# Patient Record
Sex: MALE | Race: WHITE | NOT HISPANIC OR LATINO
[De-identification: names, ages, dates, MRNs, and addresses within clinical notes are randomized per-mention and may not be internally consistent; named-entity substitution may affect disease eponyms.]

---

## 2018-04-02 ENCOUNTER — APPOINTMENT (OUTPATIENT)
Dept: HEART AND VASCULAR | Facility: CLINIC | Age: 58
End: 2018-04-02

## 2018-04-09 PROBLEM — Z00.00 ENCOUNTER FOR PREVENTIVE HEALTH EXAMINATION: Status: ACTIVE | Noted: 2018-04-09

## 2018-04-30 ENCOUNTER — APPOINTMENT (OUTPATIENT)
Dept: HEART AND VASCULAR | Facility: CLINIC | Age: 58
End: 2018-04-30
Payer: MEDICARE

## 2018-04-30 DIAGNOSIS — Z78.9 OTHER SPECIFIED HEALTH STATUS: ICD-10-CM

## 2018-04-30 PROCEDURE — XXXXX: CPT

## 2018-05-01 PROBLEM — Z78.9 NON-SMOKER: Status: ACTIVE | Noted: 2018-05-01

## 2018-05-01 RX ORDER — LEVOTHYROXINE SODIUM 0.05 MG/1
50 TABLET ORAL
Qty: 90 | Refills: 0 | Status: DISCONTINUED | COMMUNITY
Start: 2017-09-28

## 2018-05-01 RX ORDER — SUMATRIPTAN 50 MG/1
50 TABLET, FILM COATED ORAL
Qty: 30 | Refills: 0 | Status: DISCONTINUED | COMMUNITY
Start: 2018-01-11

## 2018-05-01 RX ORDER — TIZANIDINE 4 MG/1
4 TABLET ORAL
Qty: 60 | Refills: 0 | Status: DISCONTINUED | COMMUNITY
Start: 2018-01-18

## 2018-05-01 RX ORDER — SILDENAFIL 20 MG/1
20 TABLET ORAL
Qty: 60 | Refills: 0 | Status: DISCONTINUED | COMMUNITY
Start: 2017-09-13

## 2018-05-01 RX ORDER — CYCLOBENZAPRINE HYDROCHLORIDE 5 MG/1
5 TABLET, FILM COATED ORAL
Qty: 90 | Refills: 0 | Status: DISCONTINUED | COMMUNITY
Start: 2017-10-23

## 2018-05-01 RX ORDER — AMIODARONE HYDROCHLORIDE 200 MG/1
200 TABLET ORAL
Qty: 30 | Refills: 0 | Status: DISCONTINUED | COMMUNITY
Start: 2017-12-28 | End: 2018-05-01

## 2018-05-01 RX ORDER — MORPHINE SULFATE 15 MG/1
15 TABLET ORAL
Qty: 60 | Refills: 0 | Status: DISCONTINUED | COMMUNITY
Start: 2018-03-06

## 2018-05-01 RX ORDER — AMIODARONE HYDROCHLORIDE 200 MG/1
200 TABLET ORAL
Qty: 30 | Refills: 0 | Status: DISCONTINUED | COMMUNITY
Start: 2017-12-28

## 2018-05-01 RX ORDER — METOPROLOL SUCCINATE 25 MG/1
25 TABLET, EXTENDED RELEASE ORAL
Qty: 90 | Refills: 0 | Status: DISCONTINUED | COMMUNITY
Start: 2017-11-06

## 2018-05-01 RX ORDER — PANTOPRAZOLE 40 MG/1
40 TABLET, DELAYED RELEASE ORAL
Qty: 90 | Refills: 0 | Status: DISCONTINUED | COMMUNITY
Start: 2017-11-06

## 2018-05-01 RX ORDER — ESCITALOPRAM OXALATE 20 MG/1
20 TABLET ORAL
Qty: 90 | Refills: 0 | Status: DISCONTINUED | COMMUNITY
Start: 2018-01-30

## 2018-05-01 RX ORDER — MORPHINE SULFATE 30 MG/1
30 TABLET, FILM COATED, EXTENDED RELEASE ORAL
Qty: 60 | Refills: 0 | Status: DISCONTINUED | COMMUNITY
Start: 2018-04-05

## 2018-05-01 RX ORDER — METOPROLOL TARTRATE 25 MG/1
25 TABLET, FILM COATED ORAL
Qty: 90 | Refills: 0 | Status: DISCONTINUED | COMMUNITY
Start: 2017-12-25

## 2019-02-24 ENCOUNTER — RECORD ABSTRACTING (OUTPATIENT)
Age: 59
End: 2019-02-24

## 2019-02-24 DIAGNOSIS — M35.3 POLYMYALGIA RHEUMATICA: ICD-10-CM

## 2019-02-24 DIAGNOSIS — Z12.5 ENCOUNTER FOR SCREENING FOR MALIGNANT NEOPLASM OF PROSTATE: ICD-10-CM

## 2019-02-24 DIAGNOSIS — Z86.39 PERSONAL HISTORY OF OTHER ENDOCRINE, NUTRITIONAL AND METABOLIC DISEASE: ICD-10-CM

## 2019-02-24 DIAGNOSIS — Z86.69 PERSONAL HISTORY OF OTHER DISEASES OF THE NERVOUS SYSTEM AND SENSE ORGANS: ICD-10-CM

## 2019-02-24 DIAGNOSIS — Z83.49 FAMILY HISTORY OF OTHER ENDOCRINE, NUTRITIONAL AND METABOLIC DISEASES: ICD-10-CM

## 2019-02-24 DIAGNOSIS — Z82.49 FAMILY HISTORY OF ISCHEMIC HEART DISEASE AND OTHER DISEASES OF THE CIRCULATORY SYSTEM: ICD-10-CM

## 2019-02-24 DIAGNOSIS — E56.9 VITAMIN DEFICIENCY, UNSPECIFIED: ICD-10-CM

## 2019-02-24 RX ORDER — OMEPRAZOLE 20 MG/1
20 CAPSULE, DELAYED RELEASE ORAL
Refills: 0 | Status: ACTIVE | COMMUNITY

## 2019-03-15 ENCOUNTER — APPOINTMENT (OUTPATIENT)
Dept: ENDOCRINOLOGY | Facility: CLINIC | Age: 59
End: 2019-03-15
Payer: MEDICARE

## 2019-03-15 VITALS
WEIGHT: 250 LBS | DIASTOLIC BLOOD PRESSURE: 90 MMHG | HEART RATE: 101 BPM | HEIGHT: 76 IN | BODY MASS INDEX: 30.44 KG/M2 | SYSTOLIC BLOOD PRESSURE: 140 MMHG

## 2019-03-15 PROCEDURE — 99213 OFFICE O/P EST LOW 20 MIN: CPT

## 2019-05-30 NOTE — PHYSICAL EXAM
[Alert] : alert [No Acute Distress] : no acute distress [Well Nourished] : well nourished [Well Developed] : well developed [Oriented x3] : oriented to person, place, and time [Normal Insight/Judgement] : insight and judgment were intact [Normal Affect] : the affect was normal [Normal Mood] : the mood was normal

## 2019-05-30 NOTE — HISTORY OF PRESENT ILLNESS
[FreeTextEntry1] : March 15, 2019\par \par   PCP: Dr. Christa Caldera\par             Card: Dr. Yair Whalen -> Dr. Martinez ([facility])\par             and at [facility] - Dr. Tyler Fletcher fax \par             ablation at [place] - Dr. Gonzales ......\par             Pain: Dr. Johanna Bailey at [facility] - tried morphine but w/o success \par             (Dr. Marques) also\par             Dr. Vaibhav Vail at [place], [place]. \[facility]"" at [address]\par             advised stopping Lexapro and switch to another \par             medication and then see psychiatrist. \par             Now Dr. Dobson Pain Mgt in [place] - treated\par             with morphine 1/2 QID (still sees)\par            .\par            .\par            CC: Hypothyroidism/Hashimoto's thyroiditis (++abs)/goiter, TSH 5.86)\par             8/7/2015 U/S Thyroid: goiter \par             Fatigue\par             ED\par             (Hx back pain, back surgery Armaan Caio Segura at [facility] with ? sepsis & induced coma) - 2013. \par             (atrial fibrillation) on amiodarone\par             (son: DM1)\par             (anti-parietal cell antibodies)\par            .\par \par Recent TSH from\par 2/5/2019 kindly provided by Dr. Thompson withing normal range at 3.85\par HbA1c 5.4 \par vit B12 827\par \par Impression:  He has a goiter, hypothyroidism well controlled.\par \par Plan:  Same Rx.   Follow up ultrasound thyroid, probably by end of the year.\par ROV 6 months.  \par \par \par \par \par Previous notes from [employer] appended below.\par \par  October 23, 2018\par            .\par            PCP: Dr. Christa Caldera\par             Card: Dr. Yair Whalen -> Dr. Martinez ([facility])\par             and at [facility] - Dr. Tyler Fletcher fax \par             ablation at [place] - Dr. Gonzales ......\par             Pain: Dr. Johanna Bailey at [facility] - tried morphine but w/o success \par             (Dr. Marques) also\par             Dr. Vaibhav Vail at [place], [place]. \[facility]"" at [address]\par             advised stopping Lexapro and switch to another \par             medication and then see psychiatrist. \par             Now Dr. Dobson Pain Mgt in [place] - treated\par             with morphine 1/2 QID (still sees)\par            .\par            .\par            CC: Hypothyroidism/Hashimoto's thyroiditis (++abs)/goiter, TSH 5.86)\par             8/7/2015 U/S Thyroid: goiter \par             Fatigue\par             ED\par             (Hx back pain, back surgery Armaanpj Segura at [facility] with ? sepsis & induced coma) - 2013. \par             (atrial fibrillation) on amiodarone\par             (son: DM1)\par             (anti-parietal cell antibodies)\par            .\par            Needs morphine for pain.\par            Had cardiac ablation at [place] so he does not currently require amiodarone.\par            Visits regarding hypothyroidism\par            Recent TSH wihin normal limits on levothyroxine 50 mcg daily.\par            .\par            Ultrasound thyroid at [place]\par            8/7/2015:\par            .\par            "Clinical history: Hypothyroidism. Rule out nodules. \par            Comparison: None.\par            Sonography of the thyroid gland was performed.\par            The isthmus measures 4 mm.\par            The right lobe measures 6.3 x 1.9 cm.\par            The left lobe measures 6.3 x 1.9 cm.\par            The glands are diffusely and moderately heterogeneous.\par            Please evaluate for thyroiditis.\par            Vascularity is grossly unremarkable.\par            There is no evidence of mass, cyst or other abnormality.\par            Clinical correlation advised.\par            ***Electronically Signed ***\par            -----------------------------------------------\par            Everton Cooper MD 08/07/15"\par            .\par            Impression: Hypothryoidism well controlled.\par            Has simple goiter.\par            .\par            Plan: Consider follow up ultrasound in 2019.\par            Same levothyroxine 50 mcg daily. \par            Updated B12, MMA in future.\par            Updated Te, LH in future.\par            Continue sildenifil prn. - warned\par            .\par            ==\par            .\par            .\par            .\par            .\par            March 13, 2018...............................................................................ab\par            .\par            PCP: Dr. Christa Caldera\par             Card: Dr. Yair Whalen -> Dr. Martinez ([facility])\par             and at [facility] - Dr. Tyler Fletcher fax \par             Pain: Dr. Johanna Bailey at [facility] - tried morphine but w/o success \par             (Dr. Marques) also\par             Dr. Vaibhav Vail at [place], [place]. \[facility]"" at [address]\par             advised stopping Lexapro and switch to another \par             medication and then see psychiatrist. \par             Now Dr. Dobson Pain Mgt in [place] - treated\par             with morphine 1/2 QID \par            .\par            .\par            CC: Hypothyroidism/Hashimoto's thyroiditis (++abs)/goiter, TSH 5.86)\par             Fatigue\par             (Hx back pain, back surgery Armaan Segura at [facility] with ? sepsis & induced coma) - 2013. \par             (atrial fibrillation) on amiodarone\par             (son: DM1)\par             (anti-parietal cell antibodies)\par            .\par            Today is his son's 8 th birthday. His son is learning fencing from a master. \par            .\par            Mr. Newell remains on amiodarone for SVT. Has also undergone ablation with [facility] although there are still some episodes. He remains on morphine tablets for back pain and levothyroxine 50 mcg for hypothyroidism. \par            .\par            Impression: Hypothyroidism well controlled.\par            .\par            Plan: Updated TFTs\par            ROV six months\par            .\par            ==\par            .\par            .\par            Sept 13, 2017\par            .\par            PCP: Dr. Christa Caldera\par             Card: Dr. Yair Whalen -> Dr. Martinez ([facility])\par             and at [facility] - Dr. Tyler Fletcher fax \par             Pain: Dr. Johanna Bailey at [facility] - tried morphine but w/o success \par             Dr. Marques also\par             Dr. Vaibhav Vail at [facility] at [address]\par             advised stopping Lexapro and switch to another \par             medication and then see psychiatrist. \par            .\par            .\par            CC: Hypothyroidism/Hashimoto's thyroiditis (++abs)/goiter, TSH 5.86)\par             Fatigue\par             (Hx back pain, back surgery Armaanpj Segura at [facility] with ? sepsis & induced coma) - 2013. \par             (atrial fibrillation)\par             (son: DM1)\par             (anti-parietal cell antibodies)\par            .\par            58 yo - was involved in [employer]\par            . \par            Had ablation at [facility] about a month ago.\par            Arrhythmia is now less frequent and of shorter duration. \par            .\par            Cuts 15 mg morphine and uses the 7.5 about 3-4 times a day.\par            Works in about 30 minutes and last a few hours. \par            .\par            Now on Losartin for BP.\par            .\par            Remains on levothyroxine 50 mcg daily\par            .\par            Impression: TFTs appear to be in good range. \par            Hct upper limits of normal.\par            .\par            Plan: Discussed use of Cytomel as adjunct, but not in an\par            individual prone to SVT. Will check TFTs, ROV 6 months.\par            .\par            ==\par            .\par            May 10, 2017 .ab\par            .\par            PCP: Dr. Christa Caldera\par             Card: Dr. Yair Whalen -> Dr. Martinez ([facility])\par             and at [facility] - Dr. Tyler Fletcher fax \par             Pain: Dr. Johanna Bailey at [facility] - tried morphine but w/o success \par             Dr. Marques also\par             Dr. Vaibhav Vail at [facility] at [address]\par             advised stopping Lexapro and switch to another \par             medication and then see psychiatrist. \par            .\par            .\par            CC: Hypothyroidism/Hashimoto's thyroiditis (++abs)/goiter, TSH 5.86)\par             Fatigue\par             (Hx back pain, back surgery Armaan Caio Segura at [facility] with ? sepsis & induced coma) - 2013. \par             (atrial fibrillation)\par             (son: DM1)\par             (anti-parietal cell antibodies)\par            .\par            On po morphine for back pain via his physicians on [address].\par            .\par            Has Hashimoto's.\par            .\par            Impression: Has history of atrial fibrillation.\par            Notes fatigue.\par            On morphine for back pain.\par            .\par            Plan: Updated labs.\par            Call here two weeks for results\par            ==\par            .\par            Jan 11, 2017\par            .\par            PCP: Dr. Christa Caldera\par             Card: Dr. Yair Whalen -> Dr. Martinez ([facility])\par             and at [facility] - Dr. Tyler Fletcher fax \par             Pain: Dr. Johanna Bailey at [facility] - tried morphine but w/o success \par             Dr. Marques also\par             Dr. Vaibhav Vail at [place], [place]. \[facility]"" at [address]\par             advised stopping Lexapro and switch to another \par             medication and then see psychiatrist. \par            .\par            .\par            On levothyroxine 50 mcg daily for hypothyroidism d/t Hashimotos\par            Likes history and stocks.\par            Hx atrial fibrillation. \par            Back pain. \par            Uses oral morphine 15 mg BID - Dr. Dania Walter in [facility]\par            .\par            Impression: Hypothyroidism well controlled.\par            No atrial fibrillation.\par            Plan: TFTs today and f/u Dr. Thompson and cardiology and ROV in 6 months Thank you. -\par            .\par            ==\par            .\par            Sept 14, 2016\par            .\par            PCP: Dr. Christa Caldera\par             Card: Dr. Yair Whalen -> Dr. Martinez ([facility])\par             and at [facility] - Dr. Tyler Fletcher fax \par             Pain: Dr. Johanna Bailey at [facility] - tried morphine but w/o success \par             Dr. Marques also\par             Dr. Vaibhav Vail at [facility] at [address]\par             advised stopping Lexapro and switch to another \par             medication and then see psychiatrist. \par            .\par            CC: Hypothyroidism/Hashimoto's thyroiditis (++abs)/goiter, TSH 5.86)\par             Fatigue\par             (Hx back pain, back surgery Armaan Caio Segura at [facility] with ? sepsis & induced coma) - 2013. \par             (atrial fibrillation)\par             (son: DM1)\par             (anti-parietal cell antibodies)\par            .\par            Most recent labs.\par            For back, stopped opiods and swimming helped.\par            More recently back pain came back, despite swimming \par            Percocets helped. \par            .\par            ==\par            .\par            May 10, 2016\par            .\par            PCP: Dr. Christa Caldera\par             Card: Dr. Yair Whalen -> Dr. Martinez ([facility])\par             and at [facility] - Dr. Tyler Fletcher fax \par            .\par            CC: Hypothyroidism/Hashimoto's thyroiditis (++abs)/goiter, TSH 5.86)\par             Fatigue\par             (Hx back pain, back surgery at [facility] with ? sepsis & induced coma)\par             (atrial fibrillation)\par             (son: DM1)\par            .\par            Since last visit, evaluated at [facility] for SVT. \par            .\par            Impression: Hypothyroid - well controlled on levothyroxine 50 mcg daily.\par            .\par            Plan: Same Rx.\par            ROV in several months. \par            .\par            ==\par            .\par            January 12, 2016\par            .\par            PCP: Dr. Christa Caldera\par            .\par            CC: Hypothyroidism/Hashimoto's thyroiditis (++abs)/goiter, TSH 5.86)\par             Fatigue\par             (Hx back pain, back surgery at [facility] with ? sepsis & induced coma)\par            .\par            Note from September appended below. \par            Currently taking levothyroxine 50 mcg daily.\par            TFTs 12/1/2015 included TSH 2.58 \par            "Pretty please, can I raise my dose of levothyroxine...I still have fatigue? " \par            .\par            Lyme screen neg\par            Vitamin B12 649\par            HbA1c 5.2%\par            tpo  \par            .\par            Exam reveals irregularly irregular pulse, enlarged thyroid\par            .\par            Impression: He has goiter, early thyroid underactivity due to Hashimoto's thyroidits. Still suffers from fatigue despite the normal TSH.\par            Irregular pulse seems to be new and will need to be addressed. \par            .\par            Plan: EKG now at [facility].\par            "Hold" the levothyroxine until irregular pulse issue addressed. \par            .\par            ==\par            .\par            September 4, 2015\par            .\par            PCP: new PCP to be selected\par            .\par            CC: Hypothyroidism/Hashimoto's thyroiditis/goiter\par             Fatigue\par            .\par            56 yo recently diagnosed with primary hypothyroidism due to Hashimoto's thyrodiitis and started on levothyroxine. Also suffers from fatigue. A son has Type 1 diabetes.\par            .\par            Impression: Doing well.\par            .\par            Plan: Increase levothyroxine to 50 mcg daily and repeat TFTs before next visit in a few months. See PCP.\par            .\par            ==\par            .\par            August 5, 2015\par            .\par            PCP: Dr. Racquel Pena prev Dr. Tova Olivier\par            .\par            CC: Elevated TSH\par             (chronic spondylolisthesis - surgery x 3) HSGERSON Shah\par            .\par            56 yo father of 3 (24, 20 and 5) retired from [employer] for [employer].\par            .\par            Labs at [facility] on March 20 showed BS 78\par            TSH 5.86 (0.4-4.5) \par            .\par            Impression: Now taking levothyroxine 25 mcg daily.\par            Has strongly positive anti-TPO and anti-Tg antibody.\par            Ultrasound thyroid shows small goiter without any suspicous focal abnormality.\par            .\par            Plan: F/U Ultrasound one year.\par            Increase levothyroxine to 50 mcg daily.\par            See PCP for further evaluation of fatigue.\par \par \par \par \par \par \par

## 2019-11-02 ENCOUNTER — RX RENEWAL (OUTPATIENT)
Age: 59
End: 2019-11-02

## 2019-11-13 ENCOUNTER — APPOINTMENT (OUTPATIENT)
Dept: ENDOCRINOLOGY | Facility: CLINIC | Age: 59
End: 2019-11-13

## 2020-03-04 ENCOUNTER — APPOINTMENT (OUTPATIENT)
Dept: ENDOCRINOLOGY | Facility: CLINIC | Age: 60
End: 2020-03-04
Payer: MEDICARE

## 2020-03-04 ENCOUNTER — RX RENEWAL (OUTPATIENT)
Age: 60
End: 2020-03-04

## 2020-03-04 VITALS
DIASTOLIC BLOOD PRESSURE: 80 MMHG | OXYGEN SATURATION: 97 % | HEIGHT: 76 IN | HEART RATE: 74 BPM | WEIGHT: 245 LBS | SYSTOLIC BLOOD PRESSURE: 130 MMHG | BODY MASS INDEX: 29.83 KG/M2

## 2020-03-04 PROCEDURE — 36415 COLL VENOUS BLD VENIPUNCTURE: CPT

## 2020-03-04 PROCEDURE — 99214 OFFICE O/P EST MOD 30 MIN: CPT | Mod: 25

## 2020-03-06 LAB
PROLACTIN SERPL-MCNC: 6.6 NG/ML
TESTOST SERPL-MCNC: 376 NG/DL
TSH SERPL-ACNC: 1.91 UIU/ML

## 2020-03-06 NOTE — PHYSICAL EXAM
[Alert] : alert [No Acute Distress] : no acute distress [Well Nourished] : well nourished [Well Developed] : well developed [Healthy Appearance] : healthy appearance [Normal Voice/Communication] : normal voice communication [No Proptosis] : no proptosis [No Respiratory Distress] : no respiratory distress [Normal Rate and Effort] : normal respiratory rhythm and effort [Normal Rate] : heart rate was normal  [No Stigmata of Cushings Syndrome] : no stigmata of cushings syndrome [No Involuntary Movements] : no involuntary movements were seen [No Tremors] : no tremors [Oriented x3] : oriented to person, place, and time [Normal Insight/Judgement] : insight and judgment were intact [Normal Affect] : the affect was normal [Normal Mood] : the mood was normal

## 2020-03-06 NOTE — HISTORY OF PRESENT ILLNESS
[FreeTextEntry1] : Mar 04, 2020\par \par  PCP: Dr. Christa Caldera\par             Card: Dr. Yair Whalen -> Dr. Martinez ([facility])\par             and at [place] - Dr. Tyler Fletcher fax \par             ablation at [place] - Dr. Gonzales ......\par             Pain: Dr. Johanna Bailey at [facility] - tried morphine but w/o success \par             (Dr. Marques) also\par             Dr. Vaibhav Vail at [facility] at [address]\par             advised stopping Lexapro and switch to another \par             medication and then see psychiatrist. \par             Now Dr. Dobson Pain Mgt in [place] - treated\par             with morphine 1/2 QID (still sees)\par            .\par            .\par            CC: Hypothyroidism/Hashimoto's thyroiditis (++abs)/goiter, TSH 5.86)\par             8/7/2015 U/S Thyroid: goiter \par             Fatigue\par             ED\par             (Hx back pain, back surgery Armaan Caio Segura at [facility] with ? sepsis & induced coma) - 2013. \par             (atrial fibrillation) on amiodarone\par             (son: DM1)\par             (anti-parietal cell antibodies)\par            .\par \par 58 yo diagnosed with early hypothyroidism by Dr. Pena in 2015.  US reassuring.\par Takes levothyroixne 50 mcg daily   \par Uses sildenifil 20 mg or more prn. \par \par Impression:  Hypothyroidism has been well controlled on current treatment.\par Plan:  Updated TFTs today.   \par \par March 15, 2019\par \par   PCP: Dr. Christa Caldera\par             Card: Dr. Yair Whalen -> Dr. Martinez ([facility])\par             and at [place] - Dr. Tyler Fletcher fax \par             ablation at [place] - Dr. Gonzales ......\par             Pain: Dr. Johanna Bailey at [facility] - tried morphine but w/o success \par             (Dr. Marques) also\par             Dr. Vaibhav Vail at [facility] at [address]\par             advised stopping Lexapro and switch to another \par             medication and then see psychiatrist. \par             Now Dr. Dobson Pain Mgt in [place] - treated\par             with morphine 1/2 QID (still sees)\par            .\par            .\par            CC: Hypothyroidism/Hashimoto's thyroiditis (++abs)/goiter, TSH 5.86)\par             8/7/2015 U/S Thyroid: goiter \par             Fatigue\par             ED\par             (Hx back pain, back surgery Armaan Segura at [facility] with ? sepsis & induced coma) - 2013. \par             (atrial fibrillation) on amiodarone\par             (son: DM1)\par             (anti-parietal cell antibodies)\par            .\par \par Recent TSH from\par 2/5/2019 kindly provided by Dr. Thompson withing normal range at 3.85\par HbA1c 5.4 \par vit B12 827\par \par Impression:  He has a goiter, hypothyroidism well controlled.\par \par Plan:  Same Rx.   Follow up ultrasound thyroid, probably by end of the year.\par ROV 6 months.  \par \par \par \par \par Previous notes from [employer] appended below.\par \par  October 23, 2018\par            .\par            PCP: Dr. Christa Caldera\par             Card: Dr. Yair Whalen -> Dr. Martinez ([facility])\par             and at [place] - Dr. Tyler Fletcher fax \par             ablation at [place] - Dr. Gonzales ......\par             Pain: Dr. Johanna Bailey at [facility] - tried morphine but w/o success \par             (Dr. Marques) also\par             Dr. Vaibhav Vail at [facility] at [address]\par             advised stopping Lexapro and switch to another \par             medication and then see psychiatrist. \par             Now Dr. Dobson Pain Mgt in [place] - treated\par             with morphine 1/2 QID (still sees)\par            .\par            .\par            CC: Hypothyroidism/Hashimoto's thyroiditis (++abs)/goiter, TSH 5.86)\par             8/7/2015 U/S Thyroid: goiter \par             Fatigue\par             ED\par             (Hx back pain, back surgery Armaan Segura at [facility] with ? sepsis & induced coma) - 2013. \par             (atrial fibrillation) on amiodarone\par             (son: DM1)\par             (anti-parietal cell antibodies)\par            .\par            Needs morphine for pain.\par            Had cardiac ablation at [place] so he does not currently require amiodarone.\par            Visits regarding hypothyroidism\par            Recent TSH wihin normal limits on levothyroxine 50 mcg daily.\par            .\par            Ultrasound thyroid at [place]\par            8/7/2015:\par            .\par            "Clinical history: Hypothyroidism. Rule out nodules. \par            Comparison: None.\par            Sonography of the thyroid gland was performed.\par            The isthmus measures 4 mm.\par            The right lobe measures 6.3 x 1.9 cm.\par            The left lobe measures 6.3 x 1.9 cm.\par            The glands are diffusely and moderately heterogeneous.\par            Please evaluate for thyroiditis.\par            Vascularity is grossly unremarkable.\par            There is no evidence of mass, cyst or other abnormality.\par            Clinical correlation advised.\par            ***Electronically Signed ***\par            -----------------------------------------------\par            Everton Cooper MD 08/07/15"\par            .\par            Impression: Hypothryoidism well controlled.\par            Has simple goiter.\par            .\par            Plan: Consider follow up ultrasound in 2019.\par            Same levothyroxine 50 mcg daily. \par            Updated B12, MMA in future.\par            Updated Te, LH in future.\par            Continue sildenifil prn. - warned\par            .\par            ==\par            .\par            .\par            .\par            .\par            March 13, 2018...............................................................................ab\par            .\par            PCP: Dr. Christa Caldera\par             Card: Dr. Yair Whalen -> Dr. Martinez ([facility])\par             and at [place] - Dr. Tyler Fletcher fax \par             Pain: Dr. Johanna Bailey at [facility] - tried morphine but w/o success \par             (Dr. Marques) also\par             Dr. Vaibhav Vail at [facility] at [address]\par             advised stopping Lexapro and switch to another \par             medication and then see psychiatrist. \par             Now Dr. Dobson Pain Mgt in [place] - treated\par             with morphine 1/2 QID \par            .\par            .\par            CC: Hypothyroidism/Hashimoto's thyroiditis (++abs)/goiter, TSH 5.86)\par             Fatigue\par             (Hx back pain, back surgery Armaanbronwyn Segura at [facility] with ? sepsis & induced coma) - 2013. \par             (atrial fibrillation) on amiodarone\par             (son: DM1)\par             (anti-parietal cell antibodies)\par            .\par            Today is his son's 8 th birthday. His son is learning fencing from a master. \par            .\par            Mr. Newell remains on amiodarone for SVT. Has also undergone ablation with [facility] although there are still some episodes. He remains on morphine tablets for back pain and levothyroxine 50 mcg for hypothyroidism. \par            .\par            Impression: Hypothyroidism well controlled.\par            .\par            Plan: Updated TFTs\par            ROV six months\par            .\par            ==\par            .\par            .\par            Sept 13, 2017\par            .\par            PCP: Dr. Christa Caldera\par             Card: Dr. Yair Whalen -> Dr. Martinez ([facility])\par             and at [place] - Dr. Tyler Fletcher fax \par             Pain: Dr. Johanna Bailey at [facility] - tried morphine but w/o success \par             Dr. Marques also\par             Dr. Vaibhav Vail at [facility] at [address]\par             advised stopping Lexapro and switch to another \par             medication and then see psychiatrist. \par            .\par            .\par            CC: Hypothyroidism/Hashimoto's thyroiditis (++abs)/goiter, TSH 5.86)\par             Fatigue\par             (Hx back pain, back surgery Armaan Caio Segura at [facility] with ? sepsis & induced coma) - 2013. \par             (atrial fibrillation)\par             (son: DM1)\par             (anti-parietal cell antibodies)\par            .\par            58 yo - was involved in [employer]\par            . \par            Had ablation at [facility] about a month ago.\par            Arrhythmia is now less frequent and of shorter duration. \par            .\par            Cuts 15 mg morphine and uses the 7.5 about 3-4 times a day.\par            Works in about 30 minutes and last a few hours. \par            .\par            Now on Losartin for BP.\par            .\par            Remains on levothyroxine 50 mcg daily\par            .\par            Impression: TFTs appear to be in good range. \par            Hct upper limits of normal.\par            .\par            Plan: Discussed use of Cytomel as adjunct, but not in an\par            individual prone to SVT. Will check TFTs, ROV 6 months.\par            .\par            ==\par            .\par            May 10, 2017 .ab\par            .\par            PCP: Dr. Christa Caldera\par             Card: Dr. Yair Whalen -> Dr. Martinez ([facility])\par             and at [place] - Dr. Tyler Fletcher fax \par             Pain: Dr. Johanna Bailey at [facility] - tried morphine but w/o success \par             Dr. Marques also\par             Dr. Vaibhav Vail at [facility] at [address]\par             advised stopping Lexapro and switch to another \par             medication and then see psychiatrist. \par            .\par            .\par            CC: Hypothyroidism/Hashimoto's thyroiditis (++abs)/goiter, TSH 5.86)\par             Fatigue\par             (Hx back pain, back surgery Armaan Caio Segura at [facility] with ? sepsis & induced coma) - 2013. \par             (atrial fibrillation)\par             (son: DM1)\par             (anti-parietal cell antibodies)\par            .\par            On po morphine for back pain via his physicians on [address].\par            .\par            Has Hashimoto's.\par            .\par            Impression: Has history of atrial fibrillation.\par            Notes fatigue.\par            On morphine for back pain.\par            .\par            Plan: Updated labs.\par            Call here two weeks for results\par            ==\par            .\par            Jan 11, 2017\par            .\par            PCP: Dr. Christa Caldera\par             Card: Dr. Yair Whalen -> Dr. Martinez ([facility])\par             and at [place] - Dr. Tyler Fletcher fax \par             Pain: Dr. Johanna Bailey at [facility] - tried morphine but w/o success \par             Dr. Marques also\par             Dr. Vaibhav Vail at [facility] at [address]\par             advised stopping Lexapro and switch to another \par             medication and then see psychiatrist. \par            .\par            .\par            On levothyroxine 50 mcg daily for hypothyroidism d/t Hashimotos\par            Likes history and stocks.\par            Hx atrial fibrillation. \par            Back pain. \par            Uses oral morphine 15 mg BID - Dr. Dania Walter in [facility]\par            .\par            Impression: Hypothyroidism well controlled.\par            No atrial fibrillation.\par            Plan: TFTs today and f/u Dr. Thompson and cardiology and ROV in 6 months Thank you. -\par            .\par            ==\par            .\par            Sept 14, 2016\par            .\par            PCP: Dr. Christa Caldera\par             Card: Dr. Yair Whalen -> Dr. Martinez ([facility])\par             and at [place] - Dr. Tyler Fletcher fax \par             Pain: Dr. Johanna Bailey at [facility] - tried morphine but w/o success \par             Dr. Marques also\par             Dr. Vaibhav Vail at [facility] at [address]\par             advised stopping Lexapro and switch to another \par             medication and then see psychiatrist. \par            .\par            CC: Hypothyroidism/Hashimoto's thyroiditis (++abs)/goiter, TSH 5.86)\par             Fatigue\par             (Hx back pain, back surgery Armaan Segura at [facility] with ? sepsis & induced coma) - 2013. \par             (atrial fibrillation)\par             (son: DM1)\par             (anti-parietal cell antibodies)\par            .\par            Most recent labs.\par            For back, stopped opiods and swimming helped.\par            More recently back pain came back, despite swimming \par            Percocets helped. \par            .\par            ==\par            .\par            May 10, 2016\par            .\par            PCP: Dr. Christa Caldera\par             Card: Dr. Yair Whalen -> Dr. Martinez ([facility])\par             and at [place] - Dr. Tyler Fletcher fax \par            .\par            CC: Hypothyroidism/Hashimoto's thyroiditis (++abs)/goiter, TSH 5.86)\par             Fatigue\par             (Hx back pain, back surgery at [facility] with ? sepsis & induced coma)\par             (atrial fibrillation)\par             (son: DM1)\par            .\par            Since last visit, evaluated at [facility] for SVT. \par            .\par            Impression: Hypothyroid - well controlled on levothyroxine 50 mcg daily.\par            .\par            Plan: Same Rx.\par            ROV in several months. \par            .\par            ==\par            .\par            January 12, 2016\par            .\par            PCP: Dr. Christa Caldera\par            .\par            CC: Hypothyroidism/Hashimoto's thyroiditis (++abs)/goiter, TSH 5.86)\par             Fatigue\par             (Hx back pain, back surgery at [facility] with ? sepsis & induced coma)\par            .\par            Note from September appended below. \par            Currently taking levothyroxine 50 mcg daily.\par            TFTs 12/1/2015 included TSH 2.58 \par            "Pretty please, can I raise my dose of levothyroxine...I still have fatigue? " \par            .\par            Lyme screen neg\par            Vitamin B12 649\par            HbA1c 5.2%\par            tpo  \par            .\par            Exam reveals irregularly irregular pulse, enlarged thyroid\par            .\par            Impression: He has goiter, early thyroid underactivity due to Hashimoto's thyroidits. Still suffers from fatigue despite the normal TSH.\par            Irregular pulse seems to be new and will need to be addressed. \par            .\par            Plan: EKG now at [place].\par            "Hold" the levothyroxine until irregular pulse issue addressed. \par            .\par            ==\par            .\par            September 4, 2015\par            .\par            PCP: new PCP to be selected\par            .\par            CC: Hypothyroidism/Hashimoto's thyroiditis/goiter\par             Fatigue\par            .\par            56 yo recently diagnosed with primary hypothyroidism due to Hashimoto's thyrodiitis and started on levothyroxine. Also suffers from fatigue. A son has Type 1 diabetes.\par            .\par            Impression: Doing well.\par            .\par            Plan: Increase levothyroxine to 50 mcg daily and repeat TFTs before next visit in a few months. See PCP.\par            .\par            ==\par            .\par            August 5, 2015\par            .\par            PCP: Dr. Racquel Pena prev Dr. Tova Olviier\par            .\par            CC: Elevated TSH\par             (chronic spondylolisthesis - surgery x 3) HSS Alyssa\par            .\par            56 yo father of 3 (24, 20 and 5) retired from [employer] business for [employer].\par            .\par            Labs at [facility] on March 20 showed BS 78\par            TSH 5.86 (0.4-4.5) \par            .\par            Impression: Now taking levothyroxine 25 mcg daily.\par            Has strongly positive anti-TPO and anti-Tg antibody.\par            Ultrasound thyroid shows small goiter without any suspicous focal abnormality.\par            .\par            Plan: F/U Ultrasound one year.\par            Increase levothyroxine to 50 mcg daily.\par            See PCP for further evaluation of fatigue.\par \par \par \par \par \par \par

## 2020-11-03 ENCOUNTER — APPOINTMENT (OUTPATIENT)
Dept: ENDOCRINOLOGY | Facility: CLINIC | Age: 60
End: 2020-11-03
Payer: MEDICARE

## 2020-11-03 VITALS
HEIGHT: 76 IN | BODY MASS INDEX: 30.08 KG/M2 | HEART RATE: 57 BPM | SYSTOLIC BLOOD PRESSURE: 138 MMHG | WEIGHT: 247 LBS | OXYGEN SATURATION: 98 % | DIASTOLIC BLOOD PRESSURE: 80 MMHG

## 2020-11-03 DIAGNOSIS — D64.9 ANEMIA, UNSPECIFIED: ICD-10-CM

## 2020-11-03 PROCEDURE — 99214 OFFICE O/P EST MOD 30 MIN: CPT | Mod: 25

## 2020-11-03 PROCEDURE — 99072 ADDL SUPL MATRL&STAF TM PHE: CPT

## 2020-11-03 PROCEDURE — 36415 COLL VENOUS BLD VENIPUNCTURE: CPT

## 2020-11-03 NOTE — HISTORY OF PRESENT ILLNESS
[FreeTextEntry1] : Nov 03, 2020    in person    \par \par  PCP: Dr. Christa Caldera\par             Card: Dr. Yair Whalen -> Dr. Martinez ([facility]) atrial fibrillation specialist (1st ablation)\par             and at [facility] - Dr. Tyler Fletcher fax   XX as no afib when saw him\par             ablation at [place] - Dr. Christian Early (2nd ablation & 3rd)  His father had it.  \par             Pain: Dr. Johanna Bailey at [facility] - tried morphine but w/o success \par             (Dr. Marques) also\par             Dr. Vaibhav Vail at [facility] at [address]\par             advised stopping Lexapro and switch to another \par             medication and then see psychiatrist. \par             Now Dr. Walter - [facility] in [facility] - treated\par             with morphine 1/2 QID (still sees)\par            .\par            .\par            CC: Hypothyroidism/Hashimoto's thyroiditis (++abs)/goiter, TSH 5.86)\par             8/7/2015 U/S Thyroid: goiter \par             Fatigue\par             ED\par             (Hx back pain, back surgery Armaan Caio Segura at [facility] with ? sepsis & induced coma) - 2013. \par             (atrial fibrillation) on amiodarone\par             (son: DM1)\par             (anti-parietal cell antibodies)\par \par \par Still taking levothyroxine 50 mcg daily.\par Feels well.\par Had recent labs by Dr. Thompson.   TFTs were in good range.\par \par Examination: \par Constitutional:  Alert, well nourished, healthy appearance, no acute distress \par Eyes:  No proptosis, no lid lag\par Thyroid:\par Pulmonary:  No respiratory distress, no accessory muscle use; normal rate and effort\par Cardiac:  Normal rate\par Vascular: \par Endocrine:  No stigmata of Cushing’s Syndrome\par Musculoskeletal:  Normal gait, no involuntary movements\par Neurology:  No tremors\par Affect/Mood/Psych:  Oriented x 3; normal affect, normal insight/judgement, normal mood \par .\par \par Impression:  Hypothyroidism  well contolled.\par Chronic, severe, intractable back pain treated with po morphine.\par Fatigue, low PSA, Hct.\par \par Plan:   B12 leve, cortisol, Te.\par ROV six months.  \par            .\par \par \par \par \par Mar 04, 2020\par \par  PCP: Dr. Christa Caldera\par             Card: Dr. Yair Whalen -> Dr. Martinez ([facility])\par             and at [facility] - Dr. Tyler Fletcher fax \par             ablation at [place] - Dr. Gonzales ......\par             Pain: Dr. Johanna Bailey at [facility] - tried morphine but w/o success \par             (Dr. Marques) also\par             Dr. Vaibhav Vail at [facility] at [address]\par             advised stopping Lexapro and switch to another \par             medication and then see psychiatrist. \par             Now Dr. Walter - [facility] in [place] - treated\par             with morphine 1/2 QID (still sees)\par            .\par            .\par            CC: Hypothyroidism/Hashimoto's thyroiditis (++abs)/goiter, TSH 5.86)\par             8/7/2015 U/S Thyroid: goiter \par             Fatigue\par             ED\par             (Hx back pain, back surgery Armaan Caio Segura at [facility] with ? sepsis & induced coma) - 2013. \par             (atrial fibrillation) on amiodarone\par             (son: DM1)\par             (anti-parietal cell antibodies)\par            .\par \par 60 yo diagnosed with early hypothyroidism by Dr. Pena in 2015.  US reassuring.\par Takes levothyroixne 50 mcg daily   \par Uses sildenifil 20 mg or more prn. \par \par Impression:  Hypothyroidism has been well controlled on current treatment.\par Plan:  Updated TFTs today.   \par \par March 15, 2019\par \par   PCP: Dr. Christa Caldera\par             Card: Dr. Yair Whalen -> Dr. Martinez ([facility])\par             and at [facility] - Dr. Tyler Fletcher fax \par             ablation at [place] - Dr. Gonzales ......\par             Pain: Dr. Johanna Bailey at [facility] - tried morphine but w/o success \par             (Dr. Marques) also\par             Dr. Vaibhav Vail at [facility] at [address]\par             advised stopping Lexapro and switch to another \par             medication and then see psychiatrist. \par             Now Dr. [facility] in [place] - treated\par             with morphine 1/2 QID (still sees)\par            .\par            .\par            CC: Hypothyroidism/Hashimoto's thyroiditis (++abs)/goiter, TSH 5.86)\par             8/7/2015 U/S Thyroid: goiter \par             Fatigue\par             ED\par             (Hx back pain, back surgery Armaan Caio Segura at [facility] with ? sepsis & induced coma) - 2013. \par             (atrial fibrillation) on amiodarone\par             (son: DM1)\par             (anti-parietal cell antibodies)\par            .\par \par Recent TSH from\par 2/5/2019 kindly provided by Dr. Thompson withing normal range at 3.85\par HbA1c 5.4 \par vit B12 827\par \par Impression:  He has a goiter, hypothyroidism well controlled.\par \par Plan:  Same Rx.   Follow up ultrasound thyroid, probably by end of the year.\par ROV 6 months.  \par \par \par \par \par Previous notes from [employer] appended below.\par \par  October 23, 2018\par            .\par            PCP: Dr. Christa Caldera\par             Card: Dr. Yair Whalen -> Dr. Martinez ([facility])\par             and at [facility] - Dr. Tyler Fletcher fax \par             ablation at [place] - Dr. Gonzales ......\par             Pain: Dr. Johanna Bailey at [facility] - tried morphine but w/o success \par             (Dr. Marques) also\par             Dr. Vaibhav Vail at [facility] at [address]\par             advised stopping Lexapro and switch to another \par             medication and then see psychiatrist. \par             Now Dr. [facility] in [place] - treated\par             with morphine 1/2 QID (still sees)\par            .\par            .\par            CC: Hypothyroidism/Hashimoto's thyroiditis (++abs)/goiter, TSH 5.86)\par             8/7/2015 U/S Thyroid: goiter \par             Fatigue\par             ED\par             (Hx back pain, back surgery Armaan Caio Segura at [facility] with ? sepsis & induced coma) - 2013. \par             (atrial fibrillation) on amiodarone\par             (son: DM1)\par             (anti-parietal cell antibodies)\par            .\par            Needs morphine for pain.\par            Had cardiac ablation at [place] so he does not currently require amiodarone.\par            Visits regarding hypothyroidism\par            Recent TSH wihin normal limits on levothyroxine 50 mcg daily.\par            .\par            Ultrasound thyroid at [facility]\par            8/7/2015:\par            .\par            "Clinical history: Hypothyroidism. Rule out nodules. \par            Comparison: None.\par            Sonography of the thyroid gland was performed.\par            The isthmus measures 4 mm.\par            The right lobe measures 6.3 x 1.9 cm.\par            The left lobe measures 6.3 x 1.9 cm.\par            The glands are diffusely and moderately heterogeneous.\par            Please evaluate for thyroiditis.\par            Vascularity is grossly unremarkable.\par            There is no evidence of mass, cyst or other abnormality.\par            Clinical correlation advised.\par            ***Electronically Signed ***\par            -----------------------------------------------\par            Everton Cooper MD 08/07/15"\par            .\par            Impression: Hypothryoidism well controlled.\par            Has simple goiter.\par            .\par            Plan: Consider follow up ultrasound in 2019.\par            Same levothyroxine 50 mcg daily. \par            Updated B12, MMA in future.\par            Updated Te, LH in future.\par            Continue sildenifil prn. - warned\par            .\par            ==\par            .\par            .\par            .\par            .\par            March 13, 2018...............................................................................ab\par            .\par            PCP: Dr. Christa Caldera\par             Card: Dr. Yair Whalen -> Dr. Martinez ([facility])\par             and at [facility] - Dr. Tyler Fletcher fax \par             Pain: Dr. Johanna Bailey at [facility] - tried morphine but w/o success \par             (Dr. Marques) also\par             Dr. Vaibhav Vail at [facility] at [address]\par             advised stopping Lexapro and switch to another \par             medication and then see psychiatrist. \par             Now Dr. Walter - [facility] in [place] - treated\par             with morphine 1/2 QID \par            .\par            .\par            CC: Hypothyroidism/Hashimoto's thyroiditis (++abs)/goiter, TSH 5.86)\par             Fatigue\par             (Hx back pain, back surgery Armaan Caio Segura at [facility] with ? sepsis & induced coma) - 2013. \par             (atrial fibrillation) on amiodarone\par             (son: DM1)\par             (anti-parietal cell antibodies)\par            .\par            Today is his son's 8 th birthday. His son is learning fencing from a master. \par            .\par            Mr. Newell remains on amiodarone for SVT. Has also undergone ablation with benefit although there are still some episodes. He remains on morphine tablets for back pain and levothyroxine 50 mcg for hypothyroidism. \par            .\par            Impression: Hypothyroidism well controlled.\par            .\par            Plan: Updated TFTs\par            ROV six months\par            .\par            ==\par            .\par            .\par            Sept 13, 2017\par            .\par            PCP: Dr. Christa Caldera\par             Card: Dr. Yair Whalen -> Dr. Martinez ([facility])\par             and at [facility] - Dr. Tyler Fletcher fax \par             Pain: Dr. Johanna Bailey at [facility] - tried morphine but w/o success \par             Dr. Marques also\par             Dr. Vaibhav Vail at [facility] at [address]\par             advised stopping Lexapro and switch to another \par             medication and then see psychiatrist. \par            .\par            .\par            CC: Hypothyroidism/Hashimoto's thyroiditis (++abs)/goiter, TSH 5.86)\par             Fatigue\par             (Hx back pain, back surgery Armaan Segura at [facility] with ? sepsis & induced coma) - 2013. \par             (atrial fibrillation)\par             (son: DM1)\par             (anti-parietal cell antibodies)\par            .\par            58 yo - was involved in [employer]\par            . \par            Had ablation at [facility] about a month ago.\par            Arrhythmia is now less frequent and of shorter duration. \par            .\par            Cuts 15 mg morphine and uses the 7.5 about 3-4 times a day.\par            Works in about 30 minutes and last a few hours. \par            .\par            Now on Losartin for BP.\par            .\par            Remains on levothyroxine 50 mcg daily\par            .\par            Impression: TFTs appear to be in good range. \par            Hct upper limits of normal.\par            .\par            Plan: Discussed use of Cytomel as adjunct, but not in an\par            individual prone to SVT. Will check TFTs, ROV 6 months.\par            .\par            ==\par            .\par            May 10, 2017 .ab\par            .\par            PCP: Dr. Christa Caldera\par             Card: Dr. Yair Whalen -> Dr. Martinez ([facility])\par             and at [facility] - Dr. Tyler Fletcher fax \par             Pain: Dr. Johanna Bailey at [facility] - tried morphine but w/o success \par             Dr. Marques also\par             Dr. Vaibhav Vail at [facility] at [address]\par             advised stopping Lexapro and switch to another \par             medication and then see psychiatrist. \par            .\par            .\par            CC: Hypothyroidism/Hashimoto's thyroiditis (++abs)/goiter, TSH 5.86)\par             Fatigue\par             (Hx back pain, back surgery Armaan Segura at [facility] with ? sepsis & induced coma) - 2013. \par             (atrial fibrillation)\par             (son: DM1)\par             (anti-parietal cell antibodies)\par            .\par            On po morphine for back pain via his physicians on [address].\par            .\par            Has Hashimoto's.\par            .\par            Impression: Has history of atrial fibrillation.\par            Notes fatigue.\par            On morphine for back pain.\par            .\par            Plan: Updated labs.\par            Call here two weeks for results\par            ==\par            .\par            Jan 11, 2017\par            .\par            PCP: Dr. Christa Caldera\par             Card: Dr. Yair Whalen -> Dr. Martinez ([facility])\par             and at [facility] - Dr. Tyler Fletcher fax \par             Pain: Dr. Johanna Bailey at [facility] - tried morphine but w/o success \par             Dr. Marques also\par             Dr. Vaibhav Vail at [facility] at [address]\par             advised stopping Lexapro and switch to another \par             medication and then see psychiatrist. \par            .\par            .\par            On levothyroxine 50 mcg daily for hypothyroidism d/t Hashimotos\par            Likes history and stocks.\par            Hx atrial fibrillation. \par            Back pain. \par            Uses oral morphine 15 mg BID - Dr. Dania Walter in [facility]\par            .\par            Impression: Hypothyroidism well controlled.\par            No atrial fibrillation.\par            Plan: TFTs today and f/u Dr. Thompson and cardiology and ROV in 6 months Thank you. -jh\par            .\par            ==\par            .\par            Sept 14, 2016\par            .\par            PCP: Dr. Christa Caldera\par             Card: Dr. Yair Whalen -> Dr. Martinez ([facility])\par             and at [facility] - Dr. Tyler Fletcher fax \par             Pain: Dr. Johanna Bailey at [facility] - tried morphine but w/o success \par             Dr. Marques also\par             Dr. Vaibhav Vail at [facility] at [address]\par             advised stopping Lexapro and switch to another \par             medication and then see psychiatrist. \par            .\par            CC: Hypothyroidism/Hashimoto's thyroiditis (++abs)/goiter, TSH 5.86)\par             Fatigue\par             (Hx back pain, back surgery Armaanbronwyn Segura at [facility] with ? sepsis & induced coma) - 2013. \par             (atrial fibrillation)\par             (son: DM1)\par             (anti-parietal cell antibodies)\par            .\par            Most recent labs.\par            For back, stopped opiods and swimming helped.\par            More recently back pain came back, despite swimming \par            Percocets helped. \par            .\par            ==\par            .\par            May 10, 2016\par            .\par            PCP: Dr. Christa Caldera\par             Card: Dr. Yair Whalen -> Dr. Martinez ([facility])\par             and at [facility] - Dr. Tyler Fletcher fax \par            .\par            CC: Hypothyroidism/Hashimoto's thyroiditis (++abs)/goiter, TSH 5.86)\par             Fatigue\par             (Hx back pain, back surgery at [facility] with ? sepsis & induced coma)\par             (atrial fibrillation)\par             (son: DM1)\par            .\par            Since last visit, evaluated at [facility] for SVT. \par            .\par            Impression: Hypothyroid - well controlled on levothyroxine 50 mcg daily.\par            .\par            Plan: Same Rx.\par            ROV in several months. \par            .\par            ==\par            .\par            January 12, 2016\par            .\par            PCP: Dr. Christa Caldera\par            .\par            CC: Hypothyroidism/Hashimoto's thyroiditis (++abs)/goiter, TSH 5.86)\par             Fatigue\par             (Hx back pain, back surgery at [facility] with ? sepsis & induced coma)\par            .\par            Note from September appended below. \par            Currently taking levothyroxine 50 mcg daily.\par            TFTs 12/1/2015 included TSH 2.58 \par            "Pretty please, can I raise my dose of levothyroxine...I still have fatigue? " \par            .\par            Lyme screen neg\par            Vitamin B12 649\par            HbA1c 5.2%\par            tpo  \par            .\par            Exam reveals irregularly irregular pulse, enlarged thyroid\par            .\par            Impression: He has goiter, early thyroid underactivity due to Hashimoto's thyroidits. Still suffers from fatigue despite the normal TSH.\par            Irregular pulse seems to be new and will need to be addressed. \par            .\par            Plan: EKG now at [facility].\par            "Hold" the levothyroxine until irregular pulse issue addressed. \par            .\par            ==\par            .\par            September 4, 2015\par            .\par            PCP: new PCP to be selected\par            .\par            CC: Hypothyroidism/Hashimoto's thyroiditis/goiter\par             Fatigue\par            .\par            54 yo recently diagnosed with primary hypothyroidism due to Hashimoto's thyrodiitis and started on levothyroxine. Also suffers from fatigue. A son has Type 1 diabetes.\par            .\par            Impression: Doing well.\par            .\par            Plan: Increase levothyroxine to 50 mcg daily and repeat TFTs before next visit in a few months. See PCP.\par            .\par            ==\par            .\par            August 5, 2015\par            .\par            PCP: Dr. Racquel Pena prev Dr. oTva Olivier\par            .\par            CC: Elevated TSH\par             (chronic spondylolisthesis - surgery x 3) HSS Alyssa\par            .\par            54 yo father of 3 (24, 20 and 5) retired from [email or web address] for [employer].\par            .\par            Labs at [facility] on March 20 showed BS 78\par            TSH 5.86 (0.4-4.5) \par            .\par            Impression: Now taking levothyroxine 25 mcg daily.\par            Has strongly positive anti-TPO and anti-Tg antibody.\par            Ultrasound thyroid shows small goiter without any suspicous focal abnormality.\par            .\par            Plan: F/U Ultrasound one year.\par            Increase levothyroxine to 50 mcg daily.\par            See PCP for further evaluation of fatigue.\par \par \par \par \par \par \par

## 2021-03-31 ENCOUNTER — TRANSCRIPTION ENCOUNTER (OUTPATIENT)
Age: 61
End: 2021-03-31

## 2021-05-04 ENCOUNTER — APPOINTMENT (OUTPATIENT)
Dept: ENDOCRINOLOGY | Facility: CLINIC | Age: 61
End: 2021-05-04
Payer: MEDICARE

## 2021-05-04 ENCOUNTER — NON-APPOINTMENT (OUTPATIENT)
Age: 61
End: 2021-05-04

## 2021-05-04 ENCOUNTER — TRANSCRIPTION ENCOUNTER (OUTPATIENT)
Age: 61
End: 2021-05-04

## 2021-05-04 VITALS
OXYGEN SATURATION: 98 % | SYSTOLIC BLOOD PRESSURE: 120 MMHG | BODY MASS INDEX: 29.22 KG/M2 | HEIGHT: 76 IN | DIASTOLIC BLOOD PRESSURE: 80 MMHG | HEART RATE: 88 BPM | WEIGHT: 240 LBS

## 2021-05-04 LAB
ACTH SER-ACNC: 43.6 PG/ML
CORTIS SERPL-MCNC: 11.9 UG/DL
FERRITIN SERPL-MCNC: 457 NG/ML
FOLATE SERPL-MCNC: >20 NG/ML
HOMOCYSTEINE LEVEL: 11.6 UMOL/L
IF BLOCK AB SER QL: NORMAL
IRON SATN MFR SERPL: 39 %
IRON SERPL-MCNC: 121 UG/DL
LH SERPL-ACNC: 6.7 IU/L
METHYLMALONIC ACID LEVEL: 130 NMOL/L
PCA AB SER QL IF: ABNORMAL
PROLACTIN SERPL-MCNC: 11.5 NG/ML
RBC # BLD: 4.61 M/UL
RETICS # AUTO: 1.3 %
RETICS AGGREG/RBC NFR: 61.8 K/UL
TESTOST SERPL-MCNC: 263 NG/DL
TIBC SERPL-MCNC: 307 UG/DL
UIBC SERPL-MCNC: 186 UG/DL
VIT B12 SERPL-MCNC: 1024 PG/ML

## 2021-05-04 PROCEDURE — 99214 OFFICE O/P EST MOD 30 MIN: CPT | Mod: 25

## 2021-05-04 PROCEDURE — 99072 ADDL SUPL MATRL&STAF TM PHE: CPT

## 2021-05-04 PROCEDURE — 36415 COLL VENOUS BLD VENIPUNCTURE: CPT

## 2021-05-04 RX ORDER — AMIODARONE HYDROCHLORIDE 400 MG/1
TABLET ORAL
Refills: 0 | Status: DISCONTINUED | COMMUNITY
End: 2021-05-04

## 2021-05-06 LAB
T4 SERPL-MCNC: 6.5 UG/DL
TSH SERPL-ACNC: 2.4 UIU/ML

## 2021-05-06 NOTE — HISTORY OF PRESENT ILLNESS
[FreeTextEntry1] : May 04, 2021    in person\par \par  PCP: Dr. Christa Caldera\par             Card: Dr. Yair Whalen -> Dr. Martinez ([facility]) atrial fibrillation specialist (1st ablation)\par             and at [facility] - Dr. Tyler Fletcher fax   XX as no afib when saw him\par             ablation at [place] - Dr. Christian Early (2nd ablation & 3rd)  His father had it.  \par             Pain: Dr. Johanna Bailey at [facility] - tried morphine but w/o success \par             (Dr. Marques) also\par             Dr. Vaibhav Vail at [facility] at [address]\par             advised stopping Lexapro and switch to another \par             medication and then see psychiatrist. \par             Now Dr. Dobson Pain Mgt in [facility] - treated\par             with morphine 1/2 QID (still sees)\par            .\par            .\par            CC: Hypothyroidism/Hashimoto's thyroiditis (++abs)/goiter, TSH 5.86)\par             8/7/2015 U/S Thyroid: goiter \par             Fatigue\par             ED\par             (Hx back pain, back surgery Armaan Caio Segura at [facility] with ? sepsis & induced coma) - 2013. \par             (atrial fibrillation) no longer on amiodarone\par             (son: DM1)\par             (anti-parietal cell antibodies)\par \par Stopped OJ and reflux better.  \par Visits for hypothyroid.  \par \par : :\par Constitutional:  Alert, well nourished, healthy appearance, no acute distress \par Eyes:  No proptosis, no stare\par Thyroid:\par Pulmonary:  No respiratory distress, no accessory muscle use; normal rate and effort\par Cardiac:  Normal rate\par Vascular: \par Endocrine:  No stigmata of Cushing’s Syndrome\par Musculoskeletal:  Normal gait, no involuntary movements\par Neurology:  No tremors\par Affect/Mood/Psych:  Oriented x 3; normal affect, normal insight/judgement, normal mood \par .\par  Plan:  TFTs today\par Continue levothyroxine 50 mcg daily for now.  \par \par \par \par \par Nov 03, 2020    in person    \par \par  PCP: Dr. Christa Caldera\par             Card: Dr. Yair Whalen -> Dr. Martinez ([facility]) atrial fibrillation specialist (1st ablation)\par             and at [facility] - Dr. Tyler Fletcher fax   XX as no afib when saw him\par             ablation at [place] - Dr. Christian Early (2nd ablation & 3rd)  His father had it.  \par             Pain: Dr. Johanna Bailey at [facility] - tried morphine but w/o success \par             (Dr. Marques) also\par             Dr. Vaibhav Vail at [facility] at [address]\par             advised stopping Lexapro and switch to another \par             medication and then see psychiatrist. \par             Now Dr. Walter - [place] [facility] in [facility] - treated\par             with morphine 1/2 QID (still sees)\par            .\par            .\par            CC: Hypothyroidism/Hashimoto's thyroiditis (++abs)/goiter, TSH 5.86)\par             8/7/2015 U/S Thyroid: goiter \par             Fatigue\par             ED\par             (Hx back pain, back surgery Armaan Caio Segura at [facility] with ? sepsis & induced coma) - 2013. \par             (atrial fibrillation) on amiodarone\par             (son: DM1)\par             (anti-parietal cell antibodies)\par \par \par Still taking levothyroxine 50 mcg daily.\par Feels well.\par Had recent labs by Dr. Thompson.   TFTs were in good range.\par \par Examination: \par Constitutional:  Alert, well nourished, healthy appearance, no acute distress \par Eyes:  No proptosis, no lid lag\par Thyroid:\par Pulmonary:  No respiratory distress, no accessory muscle use; normal rate and effort\par Cardiac:  Normal rate\par Vascular: \par Endocrine:  No stigmata of Cushing’s Syndrome\par Musculoskeletal:  Normal gait, no involuntary movements\par Neurology:  No tremors\par Affect/Mood/Psych:  Oriented x 3; normal affect, normal insight/judgement, normal mood \par .\par \par Impression:  Hypothyroidism  well contolled.\par Chronic, severe, intractable back pain treated with po morphine.\par Fatigue, low PSA, Hct.\par \par Plan:   B12 leve, cortisol, Te.\par ROV six months.  \par            .\par \par \par \par \par Mar 04, 2020\par \par  PCP: Dr. Christa Caldera\par             Card: Dr. Yair Whalen -> Dr. Martinez ([facility])\par             and at [facility] - Dr. Tyler Fletcher fax \par             ablation at [place] - Dr. Gonzales ......\par             Pain: Dr. Johanna Bailey at [facility] - tried morphine but w/o success \par             (Dr. Marques) also\par             Dr. Vaibhav Vail at [facility] at [address]\par             advised stopping Lexapro and switch to another \par             medication and then see psychiatrist. \par             Now Dr. Walter - [facility] in [facility] - treated\par             with morphine 1/2 QID (still sees)\par            .\par            .\par            CC: Hypothyroidism/Hashimoto's thyroiditis (++abs)/goiter, TSH 5.86)\par             8/7/2015 U/S Thyroid: goiter \par             Fatigue\par             ED\par             (Hx back pain, back surgery Armaan Caio Segura at [facility] with ? sepsis & induced coma) - 2013. \par             (atrial fibrillation) on amiodarone\par             (son: DM1)\par             (anti-parietal cell antibodies)\par            .\par \par 60 yo diagnosed with early hypothyroidism by Dr. Pena in 2015.  US reassuring.\par Takes levothyroixne 50 mcg daily   \par Uses sildenifil 20 mg or more prn. \par \par Impression:  Hypothyroidism has been well controlled on current treatment.\par Plan:  Updated TFTs today.   \par \par March 15, 2019\par \par   PCP: Dr. Christa Caldera\par             Card: Dr. Yair Whalen -> Dr. Martinez ([facility])\par             and at [facility] - Dr. Tyler Fletcher fax \par             ablation at [place] - Dr. Gonzales ......\par             Pain: Dr. Johanna Bailey at [facility] - tried morphine but w/o success \par             (Dr. Marques) also\par             Dr. Vaibhav Vail at [facility] at [address]\par             advised stopping Lexapro and switch to another \par             medication and then see psychiatrist. \par             Now Dr. Dobson Pain Mgt in [place] - treated\par             with morphine 1/2 QID (still sees)\par            .\par            .\par            CC: Hypothyroidism/Hashimoto's thyroiditis (++abs)/goiter, TSH 5.86)\par             8/7/2015 U/S Thyroid: goiter \par             Fatigue\par             ED\par             (Hx back pain, back surgery Armaan Caio Segura at [facility] with ? sepsis & induced coma) - 2013. \par             (atrial fibrillation) on amiodarone\par             (son: DM1)\par             (anti-parietal cell antibodies)\par            .\par \par Recent TSH from\par 2/5/2019 kindly provided by Dr. Thompson withing normal range at 3.85\par HbA1c 5.4 \par vit B12 827\par \par Impression:  He has a goiter, hypothyroidism well controlled.\par \par Plan:  Same Rx.   Follow up ultrasound thyroid, probably by end of the year.\par ROV 6 months.  \par \par \par \par \par Previous notes from [employer] appended below.\par \par  October 23, 2018\par            .\par            PCP: Dr. Christa Caldera\par             Card: Dr. Yair Whalen -> Dr. Martinez ([facility])\par             and at [facility] - Dr. Tyler Fletcher fax \par             ablation at [place] - Dr. Gonzales ......\par             Pain: Dr. Johanna Bailey at [facility] - tried morphine but w/o success \par             (Dr. Marques) also\par             Dr. Vaibhav Vail at [facility] at [address]\par             advised stopping Lexapro and switch to another \par             medication and then see psychiatrist. \par             Now Dr. Dobson Pain Mgt in [place] - treated\par             with morphine 1/2 QID (still sees)\par            .\par            .\par            CC: Hypothyroidism/Hashimoto's thyroiditis (++abs)/goiter, TSH 5.86)\par             8/7/2015 U/S Thyroid: goiter \par             Fatigue\par             ED\par             (Hx back pain, back surgery Armaan Caio Segura at [facility] with ? sepsis & induced coma) - 2013. \par             (atrial fibrillation) on amiodarone\par             (son: DM1)\par             (anti-parietal cell antibodies)\par            .\par            Needs morphine for pain.\par            Had cardiac ablation at [place] so he does not currently require amiodarone.\par            Visits regarding hypothyroidism\par            Recent TSH wihin normal limits on levothyroxine 50 mcg daily.\par            .\par            Ultrasound thyroid at [facility]\par            8/7/2015:\par            .\par            "Clinical history: Hypothyroidism. Rule out nodules. \par            Comparison: None.\par            Sonography of the thyroid gland was performed.\par            The isthmus measures 4 mm.\par            The right lobe measures 6.3 x 1.9 cm.\par            The left lobe measures 6.3 x 1.9 cm.\par            The glands are diffusely and moderately heterogeneous.\par            Please evaluate for thyroiditis.\par            Vascularity is grossly unremarkable.\par            There is no evidence of mass, cyst or other abnormality.\par            Clinical correlation advised.\par            ***Electronically Signed ***\par            -----------------------------------------------\par            Everton Cooper MD 08/07/15"\par            .\par            Impression: Hypothryoidism well controlled.\par            Has simple goiter.\par            .\par            Plan: Consider follow up ultrasound in 2019.\par            Same levothyroxine 50 mcg daily. \par            Updated B12, MMA in future.\par            Updated Te, LH in future.\par            Continue sildenifil prn. - warned\par            .\par            ==\par            .\par            .\par            .\par            .\par            March 13, 2018...............................................................................ab\par            .\par            PCP: Dr. Christa Caldera\par             Card: Dr. Yair Whalen -> Dr. Martinez ([facility])\par             and at [facility] - Dr. Tyler Fletcher fax \par             Pain: Dr. Johanna Bailey at [facility] - tried morphine but w/o success \par             (Dr. Marques) also\par             Dr. Vaibhav Vail at [facility] at [address]\par             advised stopping Lexapro and switch to another \par             medication and then see psychiatrist. \par             Now Dr. Dobson Pain Mgt in [place] - treated\par             with morphine 1/2 QID \par            .\par            .\par            CC: Hypothyroidism/Hashimoto's thyroiditis (++abs)/goiter, TSH 5.86)\par             Fatigue\par             (Hx back pain, back surgery Armaan Caio Segura at [facility] with ? sepsis & induced coma) - 2013. \par             (atrial fibrillation) on amiodarone\par             (son: DM1)\par             (anti-parietal cell antibodies)\par            .\par            Today is his son's 8 th birthday. His son is learning fencing from a master. \par            .\par            Mr. Newell remains on amiodarone for SVT. Has also undergone ablation with benefit although there are still some episodes. He remains on morphine tablets for back pain and levothyroxine 50 mcg for hypothyroidism. \par            .\par            Impression: Hypothyroidism well controlled.\par            .\par            Plan: Updated TFTs\par            ROV six months\par            .\par            ==\par            .\par            .\par            Sept 13, 2017\par            .\par            PCP: Dr. Christa Caldera\par             Card: Dr. Yair Whalen -> Dr. Martinez ([facility])\par             and at [facility] - Dr. Tyler Fletcher fax \par             Pain: Dr. Johanna Bailey at [facility] - tried morphine but w/o success \par             Dr. Marques also\par             Dr. Vaibhav Vail at [facility] at [address]\par             advised stopping Lexapro and switch to another \par             medication and then see psychiatrist. \par            .\par            .\par            CC: Hypothyroidism/Hashimoto's thyroiditis (++abs)/goiter, TSH 5.86)\par             Fatigue\par             (Hx back pain, back surgery Armaan Segura at [facility] with ? sepsis & induced coma) - 2013. \par             (atrial fibrillation)\par             (son: DM1)\par             (anti-parietal cell antibodies)\par            .\par            58 yo - was involved in [employer]\par            . \par            Had ablation at [facility] about a month ago.\par            Arrhythmia is now less frequent and of shorter duration. \par            .\par            Cuts 15 mg morphine and uses the 7.5 about 3-4 times a day.\par            Works in about 30 minutes and last a few hours. \par            .\par            Now on Losartin for BP.\par            .\par            Remains on levothyroxine 50 mcg daily\par            .\par            Impression: TFTs appear to be in good range. \par            Hct upper limits of normal.\par            .\par            Plan: Discussed use of Cytomel as adjunct, but not in an\par            individual prone to SVT. Will check TFTs, ROV 6 months.\par            .\par            ==\par            .\par            May 10, 2017 .ab\par            .\par            PCP: Dr. Christa Caldera\par             Card: Dr. Yair Whalen -> Dr. Martinez ([facility])\par             and at [facility] - Dr. Tyler Fletcher fax \par             Pain: Dr. Johanna Bailey at [facility] - tried morphine but w/o success \par             Dr. Marques also\par             Dr. Vaibhav Vail at [facility] at [address]\par             advised stopping Lexapro and switch to another \par             medication and then see psychiatrist. \par            .\par            .\par            CC: Hypothyroidism/Hashimoto's thyroiditis (++abs)/goiter, TSH 5.86)\par             Fatigue\par             (Hx back pain, back surgery Armaan Segura at [facility] with ? sepsis & induced coma) - 2013. \par             (atrial fibrillation)\par             (son: DM1)\par             (anti-parietal cell antibodies)\par            .\par            On po morphine for back pain via his physicians on [address].\par            .\par            Has Hashimoto's.\par            .\par            Impression: Has history of atrial fibrillation.\par            Notes fatigue.\par            On morphine for back pain.\par            .\par            Plan: Updated labs.\par            Call here two weeks for results\par            ==\par            .\par            Jan 11, 2017\par            .\par            PCP: Dr. Christa Caldera\par             Card: Dr. Yair Whalen -> Dr. Martinez ([facility])\par             and at [facility] - Dr. Tyler Fletcher fax \par             Pain: Dr. Johanna Bailey at [facility] - tried morphine but w/o success \par             Dr. Marques also\par             Dr. Vaibhav Vail at [facility] at [address]\par             advised stopping Lexapro and switch to another \par             medication and then see psychiatrist. \par            .\par            .\par            On levothyroxine 50 mcg daily for hypothyroidism d/t Hashimotos\par            Likes history and stocks.\par            Hx atrial fibrillation. \par            Back pain. \par            Uses oral morphine 15 mg BID - Dr. Dania Walter in [facility]\par            .\par            Impression: Hypothyroidism well controlled.\par            No atrial fibrillation.\par            Plan: TFTs today and f/u Dr. Thompson and cardiology and ROV in 6 months Thank you. -jh\par            .\par            ==\par            .\par            Sept 14, 2016\par            .\par            PCP: Dr. Christa Caldera\par             Card: Dr. Yair Whalen -> Dr. Martinez ([facility])\par             and at [facility] - Dr. Tyler Fletcher fax \par             Pain: Dr. Johanna Bailey at [facility] - tried morphine but w/o success \par             Dr. Marques also\par             Dr. Vaibhav Vail at [facility] at [address]\par             advised stopping Lexapro and switch to another \par             medication and then see psychiatrist. \par            .\par            CC: Hypothyroidism/Hashimoto's thyroiditis (++abs)/goiter, TSH 5.86)\par             Fatigue\par             (Hx back pain, back surgery Armaan Segura at [facility] with ? sepsis & induced coma) - 2013. \par             (atrial fibrillation)\par             (son: DM1)\par             (anti-parietal cell antibodies)\par            .\par            Most recent labs.\par            For back, stopped opiods and swimming helped.\par            More recently back pain came back, despite swimming \par            Percocets helped. \par            .\par            ==\par            .\par            May 10, 2016\par            .\par            PCP: Dr. Christa Caldera\par             Card: Dr. Yair Whalen -> Dr. Martinez ([facility])\par             and at [facility] - Dr. Tyler Fletcher fax \par            .\par            CC: Hypothyroidism/Hashimoto's thyroiditis (++abs)/goiter, TSH 5.86)\par             Fatigue\par             (Hx back pain, back surgery at [facility] with ? sepsis & induced coma)\par             (atrial fibrillation)\par             (son: DM1)\par            .\par            Since last visit, evaluated at [facility] for SVT. \par            .\par            Impression: Hypothyroid - well controlled on levothyroxine 50 mcg daily.\par            .\par            Plan: Same Rx.\par            ROV in several months. \par            .\par            ==\par            .\par            January 12, 2016\par            .\par            PCP: Dr. Christa Caldera\par            .\par            CC: Hypothyroidism/Hashimoto's thyroiditis (++abs)/goiter, TSH 5.86)\par             Fatigue\par             (Hx back pain, back surgery at [facility] with ? sepsis & induced coma)\par            .\par            Note from September appended below. \par            Currently taking levothyroxine 50 mcg daily.\par            TFTs 12/1/2015 included TSH 2.58 \par            "Pretty please, can I raise my dose of levothyroxine...I still have fatigue? " \par            .\par            Lyme screen neg\par            Vitamin B12 649\par            HbA1c 5.2%\par            tpo  \par            .\par            Exam reveals irregularly irregular pulse, enlarged thyroid\par            .\par            Impression: He has goiter, early thyroid underactivity due to Hashimoto's thyroidits. Still suffers from fatigue despite the normal TSH.\par            Irregular pulse seems to be new and will need to be addressed. \par            .\par            Plan: EKG now at [facility].\par            "Hold" the levothyroxine until irregular pulse issue addressed. \par            .\par            ==\par            .\par            September 4, 2015\par            .\par            PCP: new PCP to be selected\par            .\par            CC: Hypothyroidism/Hashimoto's thyroiditis/goiter\par             Fatigue\par            .\par            54 yo recently diagnosed with primary hypothyroidism due to Hashimoto's thyrodiitis and started on levothyroxine. Also suffers from fatigue. A son has Type 1 diabetes.\par            .\par            Impression: Doing well.\par            .\par            Plan: Increase levothyroxine to 50 mcg daily and repeat TFTs before next visit in a few months. See PCP.\par            .\par            ==\par            .\par            August 5, 2015\par            .\par            PCP: Dr. Racquel Pena prev Dr. Tova Olivier\par            .\par            CC: Elevated TSH\par             (chronic spondylolisthesis - surgery x 3) HSS Alyssa\par            .\par            54 yo father of 3 (24, 20 and 5) retired from [employer] business for [email or web address].\par            .\par            Labs at [facility] on March 20 showed BS 78\par            TSH 5.86 (0.4-4.5) \par            .\par            Impression: Now taking levothyroxine 25 mcg daily.\par            Has strongly positive anti-TPO and anti-Tg antibody.\par            Ultrasound thyroid shows small goiter without any suspicous focal abnormality.\par            .\par            Plan: F/U Ultrasound one year.\par            Increase levothyroxine to 50 mcg daily.\par            See PCP for further evaluation of fatigue.\par \par \par \par \par \par \par

## 2021-08-19 ENCOUNTER — TRANSCRIPTION ENCOUNTER (OUTPATIENT)
Age: 61
End: 2021-08-19

## 2021-10-05 ENCOUNTER — APPOINTMENT (OUTPATIENT)
Dept: ENDOCRINOLOGY | Facility: CLINIC | Age: 61
End: 2021-10-05

## 2021-12-15 ENCOUNTER — APPOINTMENT (OUTPATIENT)
Dept: ENDOCRINOLOGY | Facility: CLINIC | Age: 61
End: 2021-12-15
Payer: MEDICARE

## 2021-12-15 PROCEDURE — 99214 OFFICE O/P EST MOD 30 MIN: CPT | Mod: 95

## 2021-12-23 NOTE — HISTORY OF PRESENT ILLNESS
[Home] : at home, [unfilled] , at the time of the visit. [Medical Office: ([facility])___] : at the medical office located in  [Verbal consent obtained from patient] : the patient, [unfilled] [FreeTextEntry1] : Dec 15, 2021      Doximity \par \par  PCP: Dr. Christa Caldera\par             Card: Dr. Yair Whalen -> Dr. Martinez ([facility]) atrial fibrillation specialist (1st ablation)\par             and at [facility] - Dr. Tyler Fletcher fax   XX as no afib when saw him\par             ablation at [place] - Dr. Christian Early (2nd ablation & 3rd)  His father had it.  \par             Pain: Dr. Johanna Bailey at [facility] - tried morphine but w/o success \par             (Dr. Marques) also\par             Dr. Vaibhav Vail at [facility] at [address]\par             advised stopping Lexapro and switch to another \par             medication and then see psychiatrist. \par             Now Dr. Posada Mgt in [facility] - treated\par             with morphine 1/2 QID (still sees)\par          GI: Dr. Crystal Gonzales in [place]:  upper endoscopy - colonoscopy \par            .\par            .\par            CC: Hypothyroidism/Hashimoto's thyroiditis (++abs)/goiter, TSH 5.86)\par             8/7/2015 U/S Thyroid: goiter \par             Fatigue\par             ED\par             (Hx back pain, back surgery Armaan Caio Segura at [facility] with ? sepsis & induced coma) - 2013. \par             (atrial fibrillation) no longer on amiodarone\par             (son: DM1)\par             (anti-parietal cell antibodies)\par \par Stopped OJ and reflux better.  \par Visits for hypothyroidism  \par Remains on LT4 50 mcg daily.\par \par Last labs Nov 2020 - TFTs in good range.\par \par Imp: Hypothroidism well controlled.\par + antiparietal cell abs - normal B12\par \par Plan:  Updated TFTs either at [facility] (will send Rx) or with Dr. Thompson\par ROV 6 months.\par Sildenifel and Levothyroxine renewed.  \par \par \par May 04, 2021    in person\par \par  PCP: Dr. Christa Caldera\par             Card: Dr. Yair Whalen -> Dr. Martinez ([facility]) atrial fibrillation specialist (1st ablation)\par             and at [facility] - Dr. Tyler Feltcher fax   XX as no afib when saw him\par             ablation at [place] - Dr. Christian Early (2nd ablation & 3rd)  His father had it.  \par             Pain: Dr. Johanna Bailey at [facility] - tried morphine but w/o success \par             (Dr. Marques) also\par             Dr. Vaibhav Vail at [facility] at [address]\par             advised stopping Lexapro and switch to another \par             medication and then see psychiatrist. \par             Now Dr. Walter - [facility] in [facility] - treated\par             with morphine 1/2 QID (still sees)\par            .\par            .\par            CC: Hypothyroidism/Hashimoto's thyroiditis (++abs)/goiter, TSH 5.86)\par             8/7/2015 U/S Thyroid: goiter \par             Fatigue\par             ED\par             (Hx back pain, back surgery Armaan Caio Segura at [facility] with ? sepsis & induced coma) - 2013. \par             (atrial fibrillation) no longer on amiodarone\par             (son: DM1)\par             (anti-parietal cell antibodies)\par \par Stopped OJ and reflux better.  \par Visits for hypothyroid.  \par \par : :\par Constitutional:  Alert, well nourished, healthy appearance, no acute distress \par Eyes:  No proptosis, no stare\par Thyroid:\par Pulmonary:  No respiratory distress, no accessory muscle use; normal rate and effort\par Cardiac:  Normal rate\par Vascular: \par Endocrine:  No stigmata of Cushing’s Syndrome\par Musculoskeletal:  Normal gait, no involuntary movements\par Neurology:  No tremors\par Affect/Mood/Psych:  Oriented x 3; normal affect, normal insight/judgement, normal mood \par .\par  Plan:  TFTs today\par Continue levothyroxine 50 mcg daily for now.  \par \par \par \par \par Nov 03, 2020    in person    \par \par  PCP: Dr. Christa Caldera\par             Card: Dr. Yair Whalen -> Dr. Martinez ([facility]) atrial fibrillation specialist (1st ablation)\par             and at [facility] - Dr. Tyler Fletcher fax   XX as no afib when saw him\par             ablation at [place] - Dr. Christian Early (2nd ablation & 3rd)  His father had it.  \par             Pain: Dr. Johanna Bailey at [facility] - tried morphine but w/o success \par             (Dr. Marques) also\par             Dr. Vaibhav Vail at [facility] at [address]\par             advised stopping Lexapro and switch to another \par             medication and then see psychiatrist. \par             Now Dr. Dobson Pain Mgt in [facility] - treated\par             with morphine 1/2 QID (still sees)\par            .\par            .\par            CC: Hypothyroidism/Hashimoto's thyroiditis (++abs)/goiter, TSH 5.86)\par             8/7/2015 U/S Thyroid: goiter \par             Fatigue\par             ED\par             (Hx back pain, back surgery Armaan Caio Segura at [facility] with ? sepsis & induced coma) - 2013. \par             (atrial fibrillation) on amiodarone\par             (son: DM1)\par             (anti-parietal cell antibodies)\par \par \par Still taking levothyroxine 50 mcg daily.\par Feels well.\par Had recent labs by Dr. Thompson.   TFTs were in good range.\par \par Examination: \par Constitutional:  Alert, well nourished, healthy appearance, no acute distress \par Eyes:  No proptosis, no lid lag\par Thyroid:\par Pulmonary:  No respiratory distress, no accessory muscle use; normal rate and effort\par Cardiac:  Normal rate\par Vascular: \par Endocrine:  No stigmata of Cushing’s Syndrome\par Musculoskeletal:  Normal gait, no involuntary movements\par Neurology:  No tremors\par Affect/Mood/Psych:  Oriented x 3; normal affect, normal insight/judgement, normal mood \par .\par \par Impression:  Hypothyroidism  well contolled.\par Chronic, severe, intractable back pain treated with po morphine.\par Fatigue, low PSA, Hct.\par \par Plan:   B12 leve, cortisol, Te.\par ROV six months.  \par            .\par \par \par \par \par Mar 04, 2020\par \par  PCP: Dr. Christa Caldera\par             Card: Dr. Yair Whalen -> Dr. Martinez ([facility])\par             and at [facility] - Dr. Tyler Fletcher fax \par             ablation at [place] - Dr. Gonzales ......\par             Pain: Dr. Johanna Bailey at [facility] - tried morphine but w/o success \par             (Dr. Marques) also\par             Dr. Vaibhav Vail at [facility] at [address]\par             advised stopping Lexapro and switch to another \par             medication and then see psychiatrist. \par             Now Dr. ManciniI Pain Mgt in [facility] - treated\par             with morphine 1/2 QID (still sees)\par            .\par            .\par            CC: Hypothyroidism/Hashimoto's thyroiditis (++abs)/goiter, TSH 5.86)\par             8/7/2015 U/S Thyroid: goiter \par             Fatigue\par             ED\par             (Hx back pain, back surgery Armaan Caio Segura at [facility] with ? sepsis & induced coma) - 2013. \par             (atrial fibrillation) on amiodarone\par             (son: DM1)\par             (anti-parietal cell antibodies)\par            .\par \par 58 yo diagnosed with early hypothyroidism by Dr. Pena in 2015.  US reassuring.\par Takes levothyroixne 50 mcg daily   \par Uses sildenifil 20 mg or more prn. \par \par Impression:  Hypothyroidism has been well controlled on current treatment.\par Plan:  Updated TFTs today.   \par \par March 15, 2019\par \par   PCP: Dr. Christa Caldera\par             Card: Dr. Yair Whalen -> Dr. Martinez ([facility])\par             and at [facility] - Dr. Tyler Fletcher fax \par             ablation at [place] - Dr. Gonzales ......\par             Pain: Dr. Johanna Bailey at [facility] - tried morphine but w/o success \par             (Dr. Marques) also\par             Dr. Vaibhav Vail at [facility] at [address]\par             advised stopping Lexapro and switch to another \par             medication and then see psychiatrist. \par             Now Dr. Rojoo - [facility] in [facility] - treated\par             with morphine 1/2 QID (still sees)\par            .\par            .\par            CC: Hypothyroidism/Hashimoto's thyroiditis (++abs)/goiter, TSH 5.86)\par             8/7/2015 U/S Thyroid: goiter \par             Fatigue\par             ED\par             (Hx back pain, back surgery Armaan Caio Giardi at [facility] with ? sepsis & induced coma) - 2013. \par             (atrial fibrillation) on amiodarone\par             (son: DM1)\par             (anti-parietal cell antibodies)\par            .\par \par Recent TSH from\par 2/5/2019 kindly provided by Dr. Thompson withing normal range at 3.85\par HbA1c 5.4 \par vit B12 827\par \par Impression:  He has a goiter, hypothyroidism well controlled.\par \par Plan:  Same Rx.   Follow up ultrasound thyroid, probably by end of the year.\par ROV 6 months.  \par \par \par \par \par Previous notes from [employer] appended below.\par \par  October 23, 2018\par            .\par            PCP: Dr. Christa Caldera\par             Card: Dr. Yair Whalen -> Dr. Martinez ([facility])\par             and at [facility] - Dr. Tyler Fletcher fax \par             ablation at [place] - Dr. Gonzales ......\par             Pain: Dr. Johanna Bailey at [facility] - tried morphine but w/o success \par             (Dr. Marques) also\par             Dr. Vaibhav Vail at [facility] at [address]\par             advised stopping Lexapro and switch to another \par             medication and then see psychiatrist. \par             Now Dr. ManciniI Pain Mgt in [facility] - treated\par             with morphine 1/2 QID (still sees)\par            .\par            .\par            CC: Hypothyroidism/Hashimoto's thyroiditis (++abs)/goiter, TSH 5.86)\par             8/7/2015 U/S Thyroid: goiter \par             Fatigue\par             ED\par             (Hx back pain, back surgery Armaan Caio Giardi at [facility] with ? sepsis & induced coma) - 2013. \par             (atrial fibrillation) on amiodarone\par             (son: DM1)\par             (anti-parietal cell antibodies)\par            .\par            Needs morphine for pain.\par            Had cardiac ablation at [place] so he does not currently require amiodarone.\par            Visits regarding hypothyroidism\par            Recent TSH wihin normal limits on levothyroxine 50 mcg daily.\par            .\par            Ultrasound thyroid at [facility]\par            8/7/2015:\par            .\par            "Clinical history: Hypothyroidism. Rule out nodules. \par            Comparison: None.\par            Sonography of the thyroid gland was performed.\par            The isthmus measures 4 mm.\par            The right lobe measures 6.3 x 1.9 cm.\par            The left lobe measures 6.3 x 1.9 cm.\par            The glands are diffusely and moderately heterogeneous.\par            Please evaluate for thyroiditis.\par            Vascularity is grossly unremarkable.\par            There is no evidence of mass, cyst or other abnormality.\par            Clinical correlation advised.\par            ***Electronically Signed ***\par            -----------------------------------------------\par            Everton Cooper MD 08/07/15"\par            .\par            Impression: Hypothryoidism well controlled.\par            Has simple goiter.\par            .\par            Plan: Consider follow up ultrasound in 2019.\par            Same levothyroxine 50 mcg daily. \par            Updated B12, MMA in future.\par            Updated Te, LH in future.\par            Continue sildenifil prn. - warned\par            .\par            ==\par            .\par            .\par            .\par            .\par            March 13, 2018...............................................................................ab\par            .\par            PCP: Dr. Christa Caldera\par             Card: Dr. Yair Whalen -> Dr. Martinez ([facility])\par             and at [facility] - Dr. Tyler Fletcher fax \par             Pain: Dr. Johanna Bailey at [facility] - tried morphine but w/o success \par             (Dr. Marques) also\par             Dr. Vaibhav Vail at [facility] at [address]\par             advised stopping Lexapro and switch to another \par             medication and then see psychiatrist. \par             Now Dr. Walter - [facility] in [facility] - treated\par             with morphine 1/2 QID \par            .\par            .\par            CC: Hypothyroidism/Hashimoto's thyroiditis (++abs)/goiter, TSH 5.86)\par             Fatigue\par             (Hx back pain, back surgery Armaan Segura at [facility] with ? sepsis & induced coma) - 2013. \par             (atrial fibrillation) on amiodarone\par             (son: DM1)\par             (anti-parietal cell antibodies)\par            .\par            Today is his son's 8 th birthday. His son is learning fencing from a master. \par            .\par            Mr. Newell remains on amiodarone for SVT. Has also undergone ablation with benefit although there are still some episodes. He remains on morphine tablets for back pain and levothyroxine 50 mcg for hypothyroidism. \par            .\par            Impression: Hypothyroidism well controlled.\par            .\par            Plan: Updated TFTs\par            ROV six months\par            .\par            ==\par            .\par            .\par            Sept 13, 2017\par            .\par            PCP: Dr. Christa Caldera\par             Card: Dr. Yair Whalen -> Dr. Martinez ([facility])\par             and at [facility] - Dr. Tyler Fletcher fax \par             Pain: Dr. Johanna Bailey at [facility] - tried morphine but w/o success \par             Dr. Marques also\par             Dr. Vaibhav Vail at [facility] at [address]\par             advised stopping Lexapro and switch to another \par             medication and then see psychiatrist. \par            .\par            .\par            CC: Hypothyroidism/Hashimoto's thyroiditis (++abs)/goiter, TSH 5.86)\par             Fatigue\par             (Hx back pain, back surgery Armaan Segura at [facility] with ? sepsis & induced coma) - 2013. \par             (atrial fibrillation)\par             (son: DM1)\par             (anti-parietal cell antibodies)\par            .\par            56 yo - was involved in [employer]\par            . \par            Had ablation at [facility] about a month ago.\par            Arrhythmia is now less frequent and of shorter duration. \par            .\par            Cuts 15 mg morphine and uses the 7.5 about 3-4 times a day.\par            Works in about 30 minutes and last a few hours. \par            .\par            Now on Losartin for BP.\par            .\par            Remains on levothyroxine 50 mcg daily\par            .\par            Impression: TFTs appear to be in good range. \par            Hct upper limits of normal.\par            .\par            Plan: Discussed use of Cytomel as adjunct, but not in an\par            individual prone to SVT. Will check TFTs, ROV 6 months.\par            .\par            ==\par            .\par            May 10, 2017 .ab\par            .\par            PCP: Dr. Christa Caldera\par             Card: Dr. Yair Whalen -> Dr. Martinez ([facility])\par             and at [facility] - Dr. Tyler Fletcher fax \par             Pain: Dr. Johanna Bailey at [facility] - tried morphine but w/o success \par             Dr. Marques also\par             Dr. Vaibhav Vail at [facility] at [address]\par             advised stopping Lexapro and switch to another \par             medication and then see psychiatrist. \par            .\par            .\par            CC: Hypothyroidism/Hashimoto's thyroiditis (++abs)/goiter, TSH 5.86)\par             Fatigue\par             (Hx back pain, back surgery Armaan Segura at [facility] with ? sepsis & induced coma) - 2013. \par             (atrial fibrillation)\par             (son: DM1)\par             (anti-parietal cell antibodies)\par            .\par            On po morphine for back pain via his physicians on [address].\par            .\par            Has Hashimoto's.\par            .\par            Impression: Has history of atrial fibrillation.\par            Notes fatigue.\par            On morphine for back pain.\par            .\par            Plan: Updated labs.\par            Call here two weeks for results\par            ==\par            .\par            Jan 11, 2017\par            .\par            PCP: Dr. Christa Caldera\par             Card: Dr. Yair Whalen -> Dr. Martinez ([facility])\par             and at [facility] - Dr. Tyler Fletcher fax \par             Pain: Dr. Johanna Bailey at [facility] - tried morphine but w/o success \par             Dr. Marques also\par             Dr. Vaibhav Vail at [facility] at [address]\par             advised stopping Lexapro and switch to another \par             medication and then see psychiatrist. \par            .\par            .\par            On levothyroxine 50 mcg daily for hypothyroidism d/t Hashimotos\par            Likes history and stocks.\par            Hx atrial fibrillation. \par            Back pain. \par            Uses oral morphine 15 mg BID - Dr. Dania Walter in [facility]\par            .\par            Impression: Hypothyroidism well controlled.\par            No atrial fibrillation.\par            Plan: TFTs today and f/u Dr. Thompson and cardiology and ROV in 6 months Thank you. -\par            .\par            ==\par            .\par            Sept 14, 2016\par            .\par            PCP: Dr. Christa Caldera\par             Card: Dr. Yair Whalen -> Dr. Martinez ([facility])\par             and at [facility] - Dr. Tyler Fletcher fax \par             Pain: Dr. Johanna Bailey at [facility] - tried morphine but w/o success \par             Dr. Marques also\par             Dr. Vaibhav Vial at [facility] at [address]\par             advised stopping Lexapro and switch to another \par             medication and then see psychiatrist. \par            .\par            CC: Hypothyroidism/Hashimoto's thyroiditis (++abs)/goiter, TSH 5.86)\par             Fatigue\par             (Hx back pain, back surgery Armaanbronwyn Segura at [facility] with ? sepsis & induced coma) - 2013. \par             (atrial fibrillation)\par             (son: DM1)\par             (anti-parietal cell antibodies)\par            .\par            Most recent labs.\par            For back, stopped opiods and swimming helped.\par            More recently back pain came back, despite swimming \par            Percocets helped. \par            .\par            ==\par            .\par            May 10, 2016\par            .\par            PCP: Dr. Christa Caldera\par             Card: Dr. Yair Whalen -> Dr. Martinez ([facility])\par             and at [facility] - Dr. Tyler Fletcher fax \par            .\par            CC: Hypothyroidism/Hashimoto's thyroiditis (++abs)/goiter, TSH 5.86)\par             Fatigue\par             (Hx back pain, back surgery at [facility] with ? sepsis & induced coma)\par             (atrial fibrillation)\par             (son: DM1)\par            .\par            Since last visit, evaluated at [facility] for SVT. \par            .\par            Impression: Hypothyroid - well controlled on levothyroxine 50 mcg daily.\par            .\par            Plan: Same Rx.\par            ROV in several months. \par            .\par            ==\par            .\par            January 12, 2016\par            .\par            PCP: Dr. Christa Caldera\par            .\par            CC: Hypothyroidism/Hashimoto's thyroiditis (++abs)/goiter, TSH 5.86)\par             Fatigue\par             (Hx back pain, back surgery at [facility] with ? sepsis & induced coma)\par            .\par            Note from September appended below. \par            Currently taking levothyroxine 50 mcg daily.\par            TFTs 12/1/2015 included TSH 2.58 \par            "Pretty please, can I raise my dose of levothyroxine...I still have fatigue? " \par            .\par            Lyme screen neg\par            Vitamin B12 649\par            HbA1c 5.2%\par            tpo  \par            .\par            Exam reveals irregularly irregular pulse, enlarged thyroid\par            .\par            Impression: He has goiter, early thyroid underactivity due to Hashimoto's thyroidits. Still suffers from fatigue despite the normal TSH.\par            Irregular pulse seems to be new and will need to be addressed. \par            .\par            Plan: EKG now at [facility].\par            "Hold" the levothyroxine until irregular pulse issue addressed. \par            .\par            ==\par            .\par            September 4, 2015\par            .\par            PCP: new PCP to be selected\par            .\par            CC: Hypothyroidism/Hashimoto's thyroiditis/goiter\par             Fatigue\par            .\par            54 yo recently diagnosed with primary hypothyroidism due to Hashimoto's thyrodiitis and started on levothyroxine. Also suffers from fatigue. A son has Type 1 diabetes.\par            .\par            Impression: Doing well.\par            .\par            Plan: Increase levothyroxine to 50 mcg daily and repeat TFTs before next visit in a few months. See PCP.\par            .\par            ==\par            .\par            August 5, 2015\par            .\par            PCP: Dr. Racquel Pena prev Dr. Tova Olivier\par            .\par            CC: Elevated TSH\par             (chronic spondylolisthesis - surgery x 3) HSS Alyssa\par            .\par            54 yo father of 3 (24, 20 and 5) retired from [employer] for [employer].\par            .\par            Labs at [facility] on March 20 showed BS 78\par            TSH 5.86 (0.4-4.5) \par            .\par            Impression: Now taking levothyroxine 25 mcg daily.\par            Has strongly positive anti-TPO and anti-Tg antibody.\par            Ultrasound thyroid shows small goiter without any suspicous focal abnormality.\par            .\par            Plan: F/U Ultrasound one year.\par            Increase levothyroxine to 50 mcg daily.\par            See PCP for further evaluation of fatigue.\par \par \par \par \par \par \par

## 2022-07-31 ENCOUNTER — NON-APPOINTMENT (OUTPATIENT)
Age: 62
End: 2022-07-31

## 2022-11-14 ENCOUNTER — HOSPITAL ENCOUNTER (OUTPATIENT)
Dept: HOSPITAL 74 - FASU | Age: 62
Discharge: HOME | End: 2022-11-14
Attending: STUDENT IN AN ORGANIZED HEALTH CARE EDUCATION/TRAINING PROGRAM
Payer: COMMERCIAL

## 2022-11-14 VITALS — HEART RATE: 56 BPM | TEMPERATURE: 98.2 F

## 2022-11-14 VITALS — RESPIRATION RATE: 16 BRPM

## 2022-11-14 VITALS — BODY MASS INDEX: 28.5 KG/M2

## 2022-11-14 VITALS — DIASTOLIC BLOOD PRESSURE: 80 MMHG | SYSTOLIC BLOOD PRESSURE: 135 MMHG

## 2022-11-14 DIAGNOSIS — H25.11: Primary | ICD-10-CM

## 2022-11-14 PROCEDURE — 66984 XCAPSL CTRC RMVL W/O ECP: CPT

## 2022-11-14 PROCEDURE — 08RJ3JZ REPLACEMENT OF RIGHT LENS WITH SYNTHETIC SUBSTITUTE, PERCUTANEOUS APPROACH: ICD-10-PCS | Performed by: STUDENT IN AN ORGANIZED HEALTH CARE EDUCATION/TRAINING PROGRAM

## 2022-11-14 RX ADMIN — PHENYLEPHRINE HYDROCHLORIDE SCH DROP: 0.25 SPRAY NASAL at 11:50

## 2022-11-14 RX ADMIN — KETOROLAC TROMETHAMINE SCH DROP: 5 SOLUTION OPHTHALMIC at 11:55

## 2022-11-14 RX ADMIN — TROPICAMIDE ONE DROP: 10 SOLUTION/ DROPS OPHTHALMIC at 11:45

## 2022-11-14 RX ADMIN — OFLOXACIN SCH DROP: 3 SOLUTION/ DROPS OPHTHALMIC at 11:50

## 2022-11-14 RX ADMIN — CYCLOPENTOLATE HYDROCHLORIDE SCH DROP: 10 SOLUTION/ DROPS OPHTHALMIC at 11:55

## 2022-11-14 RX ADMIN — KETOROLAC TROMETHAMINE SCH DROP: 5 SOLUTION OPHTHALMIC at 11:45

## 2022-11-14 RX ADMIN — TROPICAMIDE ONE DROP: 10 SOLUTION/ DROPS OPHTHALMIC at 11:50

## 2022-11-14 RX ADMIN — PHENYLEPHRINE HYDROCHLORIDE SCH DROP: 0.25 SPRAY NASAL at 11:45

## 2022-11-14 RX ADMIN — KETOROLAC TROMETHAMINE SCH DROP: 5 SOLUTION OPHTHALMIC at 11:50

## 2022-11-14 RX ADMIN — TROPICAMIDE ONE DROP: 10 SOLUTION/ DROPS OPHTHALMIC at 11:55

## 2022-11-14 RX ADMIN — OFLOXACIN SCH DROP: 3 SOLUTION/ DROPS OPHTHALMIC at 11:55

## 2022-11-14 RX ADMIN — OFLOXACIN SCH DROP: 3 SOLUTION/ DROPS OPHTHALMIC at 11:45

## 2022-11-14 RX ADMIN — CYCLOPENTOLATE HYDROCHLORIDE SCH DROP: 10 SOLUTION/ DROPS OPHTHALMIC at 11:45

## 2022-11-14 RX ADMIN — CYCLOPENTOLATE HYDROCHLORIDE SCH DROP: 10 SOLUTION/ DROPS OPHTHALMIC at 11:50

## 2022-12-05 ENCOUNTER — HOSPITAL ENCOUNTER (OUTPATIENT)
Dept: HOSPITAL 74 - FASU | Age: 62
Discharge: HOME | End: 2022-12-05
Attending: STUDENT IN AN ORGANIZED HEALTH CARE EDUCATION/TRAINING PROGRAM
Payer: COMMERCIAL

## 2022-12-05 VITALS — HEART RATE: 58 BPM | SYSTOLIC BLOOD PRESSURE: 110 MMHG | DIASTOLIC BLOOD PRESSURE: 64 MMHG

## 2022-12-05 VITALS — BODY MASS INDEX: 28.5 KG/M2

## 2022-12-05 VITALS — RESPIRATION RATE: 16 BRPM

## 2022-12-05 VITALS — TEMPERATURE: 98 F

## 2022-12-05 DIAGNOSIS — H25.12: Primary | ICD-10-CM

## 2022-12-05 PROCEDURE — 08RK3JZ REPLACEMENT OF LEFT LENS WITH SYNTHETIC SUBSTITUTE, PERCUTANEOUS APPROACH: ICD-10-PCS | Performed by: STUDENT IN AN ORGANIZED HEALTH CARE EDUCATION/TRAINING PROGRAM

## 2022-12-05 PROCEDURE — 66984 XCAPSL CTRC RMVL W/O ECP: CPT

## 2023-02-01 ENCOUNTER — APPOINTMENT (OUTPATIENT)
Dept: ENDOCRINOLOGY | Facility: CLINIC | Age: 63
End: 2023-02-01
Payer: MEDICARE

## 2023-02-01 VITALS
HEIGHT: 76 IN | SYSTOLIC BLOOD PRESSURE: 122 MMHG | OXYGEN SATURATION: 98 % | HEART RATE: 71 BPM | WEIGHT: 230 LBS | DIASTOLIC BLOOD PRESSURE: 71 MMHG | BODY MASS INDEX: 28.01 KG/M2

## 2023-02-01 PROCEDURE — 36415 COLL VENOUS BLD VENIPUNCTURE: CPT

## 2023-02-01 PROCEDURE — 99214 OFFICE O/P EST MOD 30 MIN: CPT | Mod: 25

## 2023-02-01 RX ORDER — ASPIRIN 325 MG/1
325 TABLET, FILM COATED ORAL DAILY
Refills: 0 | Status: DISCONTINUED | COMMUNITY
End: 2023-02-01

## 2023-02-01 NOTE — HISTORY OF PRESENT ILLNESS
[FreeTextEntry1] : Feb 01, 2023    in person\par \par  PCP: Dr. Christa Caldera\par             Card: Dr. Yair Whalen -> Dr. Martinez ([facility]) atrial fibrillation specialist (1st ablation)\par             and at [facility] - Dr. Tyler Fletcher fax   XX as no afib when saw him\par             ablation at [place] - Dr. Christian Early (2nd ablation & 3rd)  His father had it.  \par             Pain: Dr. Johanna Bailey at [facility] - tried morphine but w/o success \par             (Dr. Marques) also\par             Dr. Vaibhav Vail at [place], [place]. [facility]"" at [address]\par             advised stopping Lexapro and switch to another  medication and then see psychiatrist. \par             Now Dr. Walter - [facility] in [facility] - treated  with morphine 1/2 QID (still sees)\par          GI: Dr. Crystal Gonzales in [place]:  upper endoscopy - colonoscopy \par            .\par            .\par            CC: Hypothyroidism/Hashimoto's thyroiditis (++abs)/goiter, TSH 5.86)\par             8/7/2015 U/S Thyroid: goiter \par             Fatigue\par             ED\par             (Hx back pain, back surgery Armaan Caio Segura at [facility] with ? sepsis & induced coma) - 2013. \par             (atrial fibrillation) no longer on amiodarone\par             (son: DM1)\par             (anti-parietal cell antibodies)\par \par Remote history of atrial fibrillation treated briefly with amiodarone.\par Visits for hypothyroidism . \par 2015 thyroid ultrasound confirmed enlarged thyroid without focal abnormality.  \par He is of [place] descent, father of 6 yo son.  His wife was recently diagnosed with hemachromatosis.\par \par : :\par Constitutional:  Alert, well nourished, healthy appearance, no acute distress \par Eyes:  No proptosis, no stare\par Thyroid:  normal to palpatin\par Pulmonary:  No respiratory distress, no accessory muscle use; normal rate and effort\par Cardiac:  Normal rate\par Vascular: \par Endocrine:  No stigmata of Cushing’s Syndrome\par Musculoskeletal:  Normal gait, no involuntary movements\par Neurology:  No tremors\par Affect/Mood/Psych:  Oriented x 3; normal affect, normal insight/judgement, normal mood \par .\par   \par Impression:  \par Remains on LT4 50 mcg daily.\par \par Plan:  Updated TFTs today.\par f/u PCP in near future.\par ROV by November 2023.  \par \par \par Dec 15, 2021      Doximity \par \par  PCP: Dr. Christa Caldera\par             Card: Dr. Yair Whalen -> Dr. Martinez ([facility]) atrial fibrillation specialist (1st ablation)\par             and at [facility] - Dr. Tyler Fletcher fax   XX as no afib when saw him\par             ablation at [place] - Dr. Christian Early (2nd ablation & 3rd)  His father had it.  \par             Pain: Dr. Johanna Bailey at [facility] - tried morphine but w/o success \par             (Dr. Marques) also\par             Dr. Vaibhav Vail at [place], [place]. \[facility]"" at [address]\par             advised stopping Lexapro and switch to another \par             medication and then see psychiatrist. \par             Now Dr. Walter - [facility] in [facility] - treated\par             with morphine 1/2 QID (still sees)\par          GI: Dr. Crystal Gonzales in [place]:  upper endoscopy - colonoscopy \par            .\par            .\par            CC: Hypothyroidism/Hashimoto's thyroiditis (++abs)/goiter, TSH 5.86)\par             8/7/2015 U/S Thyroid: goiter \par             Fatigue\par             ED\par             (Hx back pain, back surgery Armaan Caio Segura at [facility] with ? sepsis & induced coma) - 2013. \par             (atrial fibrillation) no longer on amiodarone\par             (son: DM1)\par             (anti-parietal cell antibodies)\par \par Stopped OJ and reflux better.  \par Visits for hypothyroidism  \par Remains on LT4 50 mcg daily.\par \par Last labs Nov 2020 - TFTs in good range.\par \par Imp: Hypothroidism well controlled.\par + antiparietal cell abs - normal B12\par \par Plan:  Updated TFTs either at [place] (will send Rx) or with Dr. Thompson\par ROV 6 months.\par Sildenifel and Levothyroxine renewed.  \par \par \par May 04, 2021    in person\par \par  PCP: Dr. Christa Caldera\par             Card: Dr. Yair Whalen -> Dr. Martinez ([facility]) atrial fibrillation specialist (1st ablation)\par             and at [facility] - Dr. Tyler Fletcher fax   XX as no afib when saw him\par             ablation at [place] - Dr. Christian Early (2nd ablation & 3rd)  His father had it.  \par             Pain: Dr. Johanna Bailey at [facility] - tried morphine but w/o success \par             (Dr. Marques) also\par             Dr. Vaibhav Vail at [place], [place]. \[facility]"" at [address]\par             advised stopping Lexapro and switch to another \par             medication and then see psychiatrist. \par             Now Dr. Dobson Pain [facility] in [facility] - treated\par             with morphine 1/2 QID (still sees)\par            .\par            .\par            CC: Hypothyroidism/Hashimoto's thyroiditis (++abs)/goiter, TSH 5.86)\par             8/7/2015 U/S Thyroid: goiter \par             Fatigue\par             ED\par             (Hx back pain, back surgery Armaan Caio Segura at [facility] with ? sepsis & induced coma) - 2013. \par             (atrial fibrillation) no longer on amiodarone\par             (son: DM1)\par             (anti-parietal cell antibodies)\par \par Stopped OJ and reflux better.  \par Visits for hypothyroid.  \par \par : :\par Constitutional:  Alert, well nourished, healthy appearance, no acute distress \par Eyes:  No proptosis, no stare\par Thyroid:\par Pulmonary:  No respiratory distress, no accessory muscle use; normal rate and effort\par Cardiac:  Normal rate\par Vascular: \par Endocrine:  No stigmata of Cushing’s Syndrome\par Musculoskeletal:  Normal gait, no involuntary movements\par Neurology:  No tremors\par Affect/Mood/Psych:  Oriented x 3; normal affect, normal insight/judgement, normal mood \par .\par  Plan:  TFTs today\par Continue levothyroxine 50 mcg daily for now.  \par \par \par \par \par Nov 03, 2020    in person    \par \par  PCP: Dr. Christa Caldera\par             Card: Dr. Yair Whalen -> Dr. Martinez ([facility]) atrial fibrillation specialist (1st ablation)\par             and at [facility] - Dr. Tyler Fletcher fax   XX as no afib when saw him\par             ablation at [place] - Dr. Christian Early (2nd ablation & 3rd)  His father had it.  \par             Pain: Dr. Johanna Bailey at [facility] - tried morphine but w/o success \par             (Dr. Marques) also\par             Dr. Vaibhav Vail at [place], [place]. \[facility]"" at [address]\par             advised stopping Lexapro and switch to another \par             medication and then see psychiatrist. \par             Now Dr. Walter - [facility] in [facility] - treated\par             with morphine 1/2 QID (still sees)\par            .\par            .\par            CC: Hypothyroidism/Hashimoto's thyroiditis (++abs)/goiter, TSH 5.86)\par             8/7/2015 U/S Thyroid: goiter \par             Fatigue\par             ED\par             (Hx back pain, back surgery Armaan Caio Segura at [facility] with ? sepsis & induced coma) - 2013. \par             (atrial fibrillation) on amiodarone\par             (son: DM1)\par             (anti-parietal cell antibodies)\par \par \par Still taking levothyroxine 50 mcg daily.\par Feels well.\par Had recent labs by Dr. Thompsno.   TFTs were in good range.\par \par Examination: \par Constitutional:  Alert, well nourished, healthy appearance, no acute distress \par Eyes:  No proptosis, no lid lag\par Thyroid:\par Pulmonary:  No respiratory distress, no accessory muscle use; normal rate and effort\par Cardiac:  Normal rate\par Vascular: \par Endocrine:  No stigmata of Cushing’s Syndrome\par Musculoskeletal:  Normal gait, no involuntary movements\par Neurology:  No tremors\par Affect/Mood/Psych:  Oriented x 3; normal affect, normal insight/judgement, normal mood \par .\par \par Impression:  Hypothyroidism  well contolled.\par Chronic, severe, intractable back pain treated with po morphine.\par Fatigue, low PSA, Hct.\par \par Plan:   B12 leve, cortisol, Te.\par ROV six months.  \par            .\par \par \par \par \par Mar 04, 2020\par \par  PCP: Dr. Christa Caldera\par             Card: Dr. Yair Whalen -> Dr. Martinez ([facility])\par             and at [facility] - Dr. Tyler Fletcher fax \par             ablation at [place] - Dr. Gonzales ......\par             Pain: Dr. Johanna Bailey at [facility] - tried morphine but w/o success \par             (Dr. Marques) also\par             Dr. Vaibhav Vail at [place], [place]. \[facility]"" at [address]\par             advised stopping Lexapro and switch to another \par             medication and then see psychiatrist. \par             Now Dr. Dobson Pain Mgt in [place] - treated\par             with morphine 1/2 QID (still sees)\par            .\par            .\par            CC: Hypothyroidism/Hashimoto's thyroiditis (++abs)/goiter, TSH 5.86)\par             8/7/2015 U/S Thyroid: goiter \par             Fatigue\par             ED\par             (Hx back pain, back surgery Armaan Caio Segura at [facility] with ? sepsis & induced coma) - 2013. \par             (atrial fibrillation) on amiodarone\par             (son: DM1)\par             (anti-parietal cell antibodies)\par            .\par \par 58 yo diagnosed with early hypothyroidism by Dr. Pena in 2015.  US reassuring.\par Takes levothyroixne 50 mcg daily   \par Uses sildenifil 20 mg or more prn. \par \par Impression:  Hypothyroidism has been well controlled on current treatment.\par Plan:  Updated TFTs today.   \par \par March 15, 2019\par \par   PCP: Dr. Christa Caldera\par             Card: Dr. Yair Whalen -> Dr. Martinez ([facility])\par             and at [facility] - Dr. Tyler Fletcher fax \par             ablation at [place] - Dr. Gonzales ......\par             Pain: Dr. Johanna Bailey at [facility] - tried morphine but w/o success \par             (Dr. Marques) also\par             Dr. Vaibhav Vail at [facility] at [address]\par             advised stopping Lexapro and switch to another \par             medication and then see psychiatrist. \par             Now Dr. Rojoo - [facility] in [facility] - treated\par             with morphine 1/2 QID (still sees)\par            .\par            .\par            CC: Hypothyroidism/Hashimoto's thyroiditis (++abs)/goiter, TSH 5.86)\par             8/7/2015 U/S Thyroid: goiter \par             Fatigue\par             ED\par             (Hx back pain, back surgery Armaan Caio Segura at [facility] with ? sepsis & induced coma) - 2013. \par             (atrial fibrillation) on amiodarone\par             (son: DM1)\par             (anti-parietal cell antibodies)\par            .\par \par Recent TSH from\par 2/5/2019 kindly provided by Dr. Thompson withing normal range at 3.85\par HbA1c 5.4 \par vit B12 827\par \par Impression:  He has a goiter, hypothyroidism well controlled.\par \par Plan:  Same Rx.   Follow up ultrasound thyroid, probably by end of the year.\par ROV 6 months.  \par \par \par \par \par Previous notes from [employer] appended below.\par \par  October 23, 2018\par            .\par            PCP: Dr. Christa Caldera\par             Card: Dr. Yair Whalen -> Dr. Martinez ([facility])\par             and at [facility] - Dr. Tyler Fletcher fax \par             ablation at [place] - Dr. Gonzales ......\par             Pain: Dr. Johanna Bailey at [facility] - tried morphine but w/o success \par             (Dr. Marques) also\par             Dr. Vaibhav Vail at [facility] at [address]\par             advised stopping Lexapro and switch to another \par             medication and then see psychiatrist. \par             Now Dr. Dobson Pain Mgt in [facility] - treated\par             with morphine 1/2 QID (still sees)\par            .\par            .\par            CC: Hypothyroidism/Hashimoto's thyroiditis (++abs)/goiter, TSH 5.86)\par             8/7/2015 U/S Thyroid: goiter \par             Fatigue\par             ED\par             (Hx back pain, back surgery Armaan Caio Sanchesgena at [facility] with ? sepsis & induced coma) - 2013. \par             (atrial fibrillation) on amiodarone\par             (son: DM1)\par             (anti-parietal cell antibodies)\par            .\par            Needs morphine for pain.\par            Had cardiac ablation at [place] so he does not currently require amiodarone.\par            Visits regarding hypothyroidism\par            Recent TSH wihin normal limits on levothyroxine 50 mcg daily.\par            .\par            Ultrasound thyroid at [place]\par            8/7/2015:\par            .\par            "Clinical history: Hypothyroidism. Rule out nodules. \par            Comparison: None.\par            Sonography of the thyroid gland was performed.\par            The isthmus measures 4 mm.\par            The right lobe measures 6.3 x 1.9 cm.\par            The left lobe measures 6.3 x 1.9 cm.\par            The glands are diffusely and moderately heterogeneous.\par            Please evaluate for thyroiditis.\par            Vascularity is grossly unremarkable.\par            There is no evidence of mass, cyst or other abnormality.\par            Clinical correlation advised.\par            ***Electronically Signed ***\par            -----------------------------------------------\par            Everton Cooper MD 08/07/15"\par            .\par            Impression: Hypothryoidism well controlled.\par            Has simple goiter.\par            .\par            Plan: Consider follow up ultrasound in 2019.\par            Same levothyroxine 50 mcg daily. \par            Updated B12, MMA in future.\par            Updated Te, LH in future.\par            Continue sildenifil prn. - warned\par            .\par            ==\par            .\par            .\par            .\par            .\par            March 13, 2018...............................................................................ab\par            .\par            PCP: Dr. Christa Caldera\par             Card: Dr. Yair Whalen -> Dr. Martinez ([facility])\par             and at [facility] - Dr. Tyler Fletcher fax \par             Pain: Dr. Johanna Bailey at [facility] - tried morphine but w/o success \par             (Dr. Marques) also\par             Dr. Vaibhav Vail at [place] [place]. \[facility]"" at [address]\par             advised stopping Lexapro and switch to another \par             medication and then see psychiatrist. \par             Now Dr. Dobson Pain Mgt in [place] - treated\par             with morphine 1/2 QID \par            .\par            .\par            CC: Hypothyroidism/Hashimoto's thyroiditis (++abs)/goiter, TSH 5.86)\par             Fatigue\par             (Hx back pain, back surgery Armaan Segura at [facility] with ? sepsis & induced coma) - 2013. \par             (atrial fibrillation) on amiodarone\par             (son: DM1)\par             (anti-parietal cell antibodies)\par            .\par            Today is his son's 8 th birthday. His son is learning fencing from a master. \par            .\par            Mr. Newell remains on amiodarone for SVT. Has also undergone ablation with benefit although there are still some episodes. He remains on morphine tablets for back pain and levothyroxine 50 mcg for hypothyroidism. \par            .\par            Impression: Hypothyroidism well controlled.\par            .\par            Plan: Updated TFTs\par            ROV six months\par            .\par            ==\par            .\par            .\par            Sept 13, 2017\par            .\par            PCP: Dr. Christa Caldera\par             Card: Dr. Yair Whalen -> Dr. Martinez ([facility])\par             and at [facility] - Dr. Tyler Fletcher fax \par             Pain: Dr. Johanna Bailey at [facility] - tried morphine but w/o success \par             Dr. Marques also\par             Dr. Vaibhav Vail at [place], [place]"" at [address]\par             advised stopping Lexapro and switch to another \par             medication and then see psychiatrist. \par            .\par            .\par            CC: Hypothyroidism/Hashimoto's thyroiditis (++abs)/goiter, TSH 5.86)\par             Fatigue\par             (Hx back pain, back surgery Armaan Caio Segura at [facility] with ? sepsis & induced coma) - 2013. \par             (atrial fibrillation)\par             (son: DM1)\par             (anti-parietal cell antibodies)\par            .\par            56 yo - was involved in [employer]\par            . \par            Had ablation at [facility] about a month ago.\par            Arrhythmia is now less frequent and of shorter duration. \par            .\par            Cuts 15 mg morphine and uses the 7.5 about 3-4 times a day.\par            Works in about 30 minutes and last a few hours. \par            .\par            Now on Losartin for BP.\par            .\par            Remains on levothyroxine 50 mcg daily\par            .\par            Impression: TFTs appear to be in good range. \par            Hct upper limits of normal.\par            .\par            Plan: Discussed use of Cytomel as adjunct, but not in an\par            individual prone to SVT. Will check TFTs, ROV 6 months.\par            .\par            ==\par            .\par            May 10, 2017 .ab\par            .\par            PCP: Dr. Christa Caldera\par             Card: Dr. Yair Whalen -> Dr. Martinez ([facility])\par             and at [facility] - Dr. Tyler Fletcher fax \par             Pain: Dr. Johanna Bailey at [facility] - tried morphine but w/o success \par             Dr. Marques also\par             Dr. Vaibhav Vail at [place] [place] \[facility]"" at [address]\par             advised stopping Lexapro and switch to another \par             medication and then see psychiatrist. \par            .\par            .\par            CC: Hypothyroidism/Hashimoto's thyroiditis (++abs)/goiter, TSH 5.86)\par             Fatigue\par             (Hx back pain, back surgery Armaanpj Segura at [facility] with ? sepsis & induced coma) - 2013. \par             (atrial fibrillation)\par             (son: DM1)\par             (anti-parietal cell antibodies)\par            .\par            On po morphine for back pain via his physicians on [address].\par            .\par            Has Hashimoto's.\par            .\par            Impression: Has history of atrial fibrillation.\par            Notes fatigue.\par            On morphine for back pain.\par            .\par            Plan: Updated labs.\par            Call here two weeks for results\par            ==\par            .\par            Jan 11, 2017\par            .\par            PCP: Dr. Christa Caldera\par             Card: Dr. Yair Whalen -> Dr. Martinez ([facility])\par             and at [facility] - Dr. Tyler Fletcher fax \par             Pain: Dr. Johanna Bailey at [facility] - tried morphine but w/o success \par             Dr. Marques also\par             Dr. Vaibhav Vail at [place] [place] \[facility]"" at [address]\par             advised stopping Lexapro and switch to another \par             medication and then see psychiatrist. \par            .\par            .\par            On levothyroxine 50 mcg daily for hypothyroidism d/t Hashimotos\par            Likes history and stocks.\par            Hx atrial fibrillation. \par            Back pain. \par            Uses oral morphine 15 mg BID - Dr. Dania Walter in [facility]\par            .\par            Impression: Hypothyroidism well controlled.\par            No atrial fibrillation.\par            Plan: TFTs today and f/u Dr. Thompson and cardiology and ROV in 6 months Thank you. -jh\par            .\par            ==\par            .\par            Sept 14, 2016\par            .\par            PCP: Dr. Christa Caldera\par             Card: Dr. Yair Whalen -> Dr. Martinez ([facility])\par             and at [facility] - Dr. Tyler Fletcher fax \par             Pain: Dr. Johanna Bailey at [facility] - tried morphine but w/o success \par             Dr. Marques also\par             Dr. Vaibhav Vail at [facility] at [address]\par             advised stopping Lexapro and switch to another \par             medication and then see psychiatrist. \par            .\par            CC: Hypothyroidism/Hashimoto's thyroiditis (++abs)/goiter, TSH 5.86)\par             Fatigue\par             (Hx back pain, back surgery Armaan Caio Segura at [facility] with ? sepsis & induced coma) - 2013. \par             (atrial fibrillation)\par             (son: DM1)\par             (anti-parietal cell antibodies)\par            .\par            Most recent labs.\par            For back, stopped opiods and swimming helped.\par            More recently back pain came back, despite swimming \par            Percocets helped. \par            .\par            ==\par            .\par            May 10, 2016\par            .\par            PCP: Dr. Christa Caldera\par             Card: Dr. Yair Whalen -> Dr. Martinez ([facility])\par             and at [facility] - Dr. Tyler Fletcher fax \par            .\par            CC: Hypothyroidism/Hashimoto's thyroiditis (++abs)/goiter, TSH 5.86)\par             Fatigue\par             (Hx back pain, back surgery at [facility] with ? sepsis & induced coma)\par             (atrial fibrillation)\par             (son: DM1)\par            .\par            Since last visit, evaluated at [facility] for SVT. \par            .\par            Impression: Hypothyroid - well controlled on levothyroxine 50 mcg daily.\par            .\par            Plan: Same Rx.\par            ROV in several months. \par            .\par            ==\par            .\par            January 12, 2016\par            .\par            PCP: Dr. Christa Caldera\par            .\par            CC: Hypothyroidism/Hashimoto's thyroiditis (++abs)/goiter, TSH 5.86)\par             Fatigue\par             (Hx back pain, back surgery at [facility] with ? sepsis & induced coma)\par            .\par            Note from September appended below. \par            Currently taking levothyroxine 50 mcg daily.\par            TFTs 12/1/2015 included TSH 2.58 \par            "Pretty please, can I raise my dose of levothyroxine...I still have fatigue? " \par            .\par            Lyme screen neg\par            Vitamin B12 649\par            HbA1c 5.2%\par            tpo  \par            .\par            Exam reveals irregularly irregular pulse, enlarged thyroid\par            .\par            Impression: He has goiter, early thyroid underactivity due to Hashimoto's thyroidits. Still suffers from fatigue despite the normal TSH.\par            Irregular pulse seems to be new and will need to be addressed. \par            .\par            Plan: EKG now at [place].\par            "Hold" the levothyroxine until irregular pulse issue addressed. \par            .\par            ==\par            .\par            September 4, 2015\par            .\par            PCP: new PCP to be selected\par            .\par            CC: Hypothyroidism/Hashimoto's thyroiditis/goiter\par             Fatigue\par            .\par            56 yo recently diagnosed with primary hypothyroidism due to Hashimoto's thyrodiitis and started on levothyroxine. Also suffers from fatigue. A son has Type 1 diabetes.\par            .\par            Impression: Doing well.\par            .\par            Plan: Increase levothyroxine to 50 mcg daily and repeat TFTs before next visit in a few months. See PCP.\par            .\par            ==\par            .\par            August 5, 2015\par            .\par            PCP: Dr. Racquel Pena prev Dr. Tova Olivier\par            .\par            CC: Elevated TSH\par             (chronic spondylolisthesis - surgery x 3) HSS Alyssa\par            .\par            56 yo father of 3 (24, 20 and 5) retired from [employer] business for [employer].\par            .\par            Labs at [facility] on March 20 showed BS 78\par            TSH 5.86 (0.4-4.5) \par            .\par            Impression: Now taking levothyroxine 25 mcg daily.\par            Has strongly positive anti-TPO and anti-Tg antibody.\par            Ultrasound thyroid shows small goiter without any suspicous focal abnormality.\par            .\par            Plan: F/U Ultrasound one year.\par            Increase levothyroxine to 50 mcg daily.\par            See PCP for further evaluation of fatigue.\par \par \par \par \par \par \par

## 2023-02-02 LAB
ALBUMIN SERPL ELPH-MCNC: 4.4 G/DL
ALP BLD-CCNC: 101 U/L
ALT SERPL-CCNC: 20 U/L
ANION GAP SERPL CALC-SCNC: 14 MMOL/L
AST SERPL-CCNC: 20 U/L
BASOPHILS # BLD AUTO: 0.09 K/UL
BASOPHILS NFR BLD AUTO: 1.2 %
BILIRUB DIRECT SERPL-MCNC: 0.2 MG/DL
BILIRUB INDIRECT SERPL-MCNC: 0.4 MG/DL
BILIRUB SERPL-MCNC: 0.6 MG/DL
BUN SERPL-MCNC: 18 MG/DL
CALCIUM SERPL-MCNC: 9.7 MG/DL
CHLORIDE SERPL-SCNC: 92 MMOL/L
CHOLEST SERPL-MCNC: 139 MG/DL
CO2 SERPL-SCNC: 29 MMOL/L
CREAT SERPL-MCNC: 1.11 MG/DL
EGFR: 74 ML/MIN/1.73M2
EOSINOPHIL # BLD AUTO: 0.17 K/UL
EOSINOPHIL NFR BLD AUTO: 2.3 %
FERRITIN SERPL-MCNC: 300 NG/ML
GLUCOSE SERPL-MCNC: 134 MG/DL
HCT VFR BLD CALC: 41.2 %
HDLC SERPL-MCNC: 51 MG/DL
HGB BLD-MCNC: 13.8 G/DL
IMM GRANULOCYTES NFR BLD AUTO: 0.3 %
IRON SATN MFR SERPL: 22 %
IRON SERPL-MCNC: 70 UG/DL
LDLC SERPL CALC-MCNC: 72 MG/DL
LH SERPL-ACNC: 9 IU/L
LYMPHOCYTES # BLD AUTO: 2.43 K/UL
LYMPHOCYTES NFR BLD AUTO: 33.2 %
MAN DIFF?: NORMAL
MCHC RBC-ENTMCNC: 30.3 PG
MCHC RBC-ENTMCNC: 33.5 GM/DL
MCV RBC AUTO: 90.4 FL
MONOCYTES # BLD AUTO: 0.52 K/UL
MONOCYTES NFR BLD AUTO: 7.1 %
NEUTROPHILS # BLD AUTO: 4.1 K/UL
NEUTROPHILS NFR BLD AUTO: 55.9 %
NONHDLC SERPL-MCNC: 89 MG/DL
PLATELET # BLD AUTO: 223 K/UL
POTASSIUM SERPL-SCNC: 3.3 MMOL/L
PROT SERPL-MCNC: 6.7 G/DL
RBC # BLD: 4.56 M/UL
RBC # FLD: 13 %
SODIUM SERPL-SCNC: 136 MMOL/L
T4 SERPL-MCNC: 5.8 UG/DL
TESTOST SERPL-MCNC: 548 NG/DL
TIBC SERPL-MCNC: 314 UG/DL
TRIGL SERPL-MCNC: 84 MG/DL
TSH SERPL-ACNC: 2.86 UIU/ML
UIBC SERPL-MCNC: 244 UG/DL
VIT B12 SERPL-MCNC: 583 PG/ML
WBC # FLD AUTO: 7.33 K/UL

## 2023-02-04 LAB — TM INTERPRETATION: NORMAL

## 2023-06-27 ENCOUNTER — APPOINTMENT (OUTPATIENT)
Dept: INTERNAL MEDICINE | Facility: CLINIC | Age: 63
End: 2023-06-27
Payer: MEDICARE

## 2023-06-27 VITALS
HEART RATE: 72 BPM | HEIGHT: 76 IN | WEIGHT: 226 LBS | SYSTOLIC BLOOD PRESSURE: 112 MMHG | OXYGEN SATURATION: 97 % | BODY MASS INDEX: 27.52 KG/M2 | DIASTOLIC BLOOD PRESSURE: 64 MMHG

## 2023-06-27 DIAGNOSIS — M54.50 LOW BACK PAIN, UNSPECIFIED: ICD-10-CM

## 2023-06-27 DIAGNOSIS — G89.29 LOW BACK PAIN, UNSPECIFIED: ICD-10-CM

## 2023-06-27 DIAGNOSIS — Z86.39 PERSONAL HISTORY OF OTHER ENDOCRINE, NUTRITIONAL AND METABOLIC DISEASE: ICD-10-CM

## 2023-06-27 DIAGNOSIS — E53.8 DEFICIENCY OF OTHER SPECIFIED B GROUP VITAMINS: ICD-10-CM

## 2023-06-27 DIAGNOSIS — E29.1 TESTICULAR HYPOFUNCTION: ICD-10-CM

## 2023-06-27 DIAGNOSIS — Z87.19 PERSONAL HISTORY OF OTHER DISEASES OF THE DIGESTIVE SYSTEM: ICD-10-CM

## 2023-06-27 PROCEDURE — G0009: CPT | Mod: 59

## 2023-06-27 PROCEDURE — 90715 TDAP VACCINE 7 YRS/> IM: CPT

## 2023-06-27 PROCEDURE — 90677 PCV20 VACCINE IM: CPT

## 2023-06-27 PROCEDURE — 90471 IMMUNIZATION ADMIN: CPT

## 2023-06-27 PROCEDURE — 99203 OFFICE O/P NEW LOW 30 MIN: CPT | Mod: 25

## 2023-06-27 NOTE — DATA REVIEWED
[FreeTextEntry1] : reviewed last labs and endo consults with pt. Also reviewed cardio note in chart.

## 2023-06-27 NOTE — PHYSICAL EXAM
[No JVD] : no jugular venous distention [Normal Rate] : normal rate  [Regular Rhythm] : with a regular rhythm [Normal S1, S2] : normal S1 and S2 [No Edema] : there was no peripheral edema [Normal] : affect was normal and insight and judgment were intact [de-identified] : very fair skinned, annual derm eval advised

## 2023-06-27 NOTE — HISTORY OF PRESENT ILLNESS
[FreeTextEntry1] : Consultation  [de-identified] : Prior PCP Dr Thompson\par Pt here for consultation and to establish care.\par He feels well at present.\par He has a h/o HTN, A Fib s/p three ablations, Hypothyroidism, Severe lower back pain on morphine. He has had three lower back surgeries but they were unsuccessful and he follows with pain mgmt. He also ha anxiety that is well controlled on lexapro. He needs a refill. \par He exercises regularly without any cardiac complaints.

## 2023-06-27 NOTE — HEALTH RISK ASSESSMENT
[Good] : ~his/her~ current health as good [Very Good] : ~his/her~  mood as very good [Yes] : Yes [No] : In the past 12 months have you used drugs other than those required for medical reasons? No [No falls in past year] : Patient reported no falls in the past year [0] : 2) Feeling down, depressed, or hopeless: Not at all (0) [HIV test declined] : HIV test declined [Hepatitis C test declined] : Hepatitis C test declined [Patient reported colonoscopy was normal] : Patient reported colonoscopy was normal [None] : None [With Significant Other] : lives with significant other [Employed] : employed [] :  [# Of Children ___] : has [unfilled] children [Feels Safe at Home] : Feels safe at home [Fully functional (bathing, dressing, toileting, transferring, walking, feeding)] : Fully functional (bathing, dressing, toileting, transferring, walking, feeding) [Fully functional (using the telephone, shopping, preparing meals, housekeeping, doing laundry, using] : Fully functional and needs no help or supervision to perform IADLs (using the telephone, shopping, preparing meals, housekeeping, doing laundry, using transportation, managing medications and managing finances) [Never] : Never [de-identified] : swimming  [de-identified] : regular diet  [[id number]] : 0 [ColonoscopyDate] : 01/21 [ColonoscopyComments] : In [place],  5 years [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

## 2023-07-23 ENCOUNTER — NON-APPOINTMENT (OUTPATIENT)
Age: 63
End: 2023-07-23

## 2023-08-28 RX ORDER — ESCITALOPRAM OXALATE 10 MG/1
10 TABLET ORAL
Qty: 90 | Refills: 3 | Status: ACTIVE | COMMUNITY
Start: 1900-01-01 | End: 1900-01-01

## 2023-09-22 ENCOUNTER — NON-APPOINTMENT (OUTPATIENT)
Age: 63
End: 2023-09-22

## 2023-10-24 ENCOUNTER — APPOINTMENT (OUTPATIENT)
Dept: ENDOCRINOLOGY | Facility: CLINIC | Age: 63
End: 2023-10-24
Payer: MEDICARE

## 2023-10-24 VITALS
DIASTOLIC BLOOD PRESSURE: 78 MMHG | BODY MASS INDEX: 27.64 KG/M2 | WEIGHT: 227 LBS | HEART RATE: 78 BPM | SYSTOLIC BLOOD PRESSURE: 110 MMHG | HEIGHT: 76 IN | OXYGEN SATURATION: 96 %

## 2023-10-24 DIAGNOSIS — E83.110 HEREDITARY HEMOCHROMATOSIS: ICD-10-CM

## 2023-10-24 PROCEDURE — 99214 OFFICE O/P EST MOD 30 MIN: CPT

## 2023-10-24 RX ORDER — SILDENAFIL 20 MG/1
20 TABLET ORAL
Qty: 60 | Refills: 11 | Status: ACTIVE | COMMUNITY
Start: 2019-11-02 | End: 1900-01-01

## 2023-10-25 LAB
ANION GAP SERPL CALC-SCNC: 14 MMOL/L
BUN SERPL-MCNC: 13 MG/DL
CALCIUM SERPL-MCNC: 9.8 MG/DL
CHLORIDE SERPL-SCNC: 91 MMOL/L
CO2 SERPL-SCNC: 30 MMOL/L
CREAT SERPL-MCNC: 1.03 MG/DL
EGFR: 81 ML/MIN/1.73M2
ESTIMATED AVERAGE GLUCOSE: 120 MG/DL
GLUCOSE SERPL-MCNC: 84 MG/DL
HBA1C MFR BLD HPLC: 5.8 %
POTASSIUM SERPL-SCNC: 3.7 MMOL/L
SODIUM SERPL-SCNC: 135 MMOL/L
T4 SERPL-MCNC: 7.1 UG/DL
TSH SERPL-ACNC: 3.18 UIU/ML

## 2023-11-02 ENCOUNTER — RX RENEWAL (OUTPATIENT)
Age: 63
End: 2023-11-02

## 2023-12-19 DIAGNOSIS — Z00.00 ENCOUNTER FOR GENERAL ADULT MEDICAL EXAMINATION W/OUT ABNORMAL FINDINGS: ICD-10-CM

## 2023-12-20 ENCOUNTER — LABORATORY RESULT (OUTPATIENT)
Age: 63
End: 2023-12-20

## 2023-12-20 ENCOUNTER — APPOINTMENT (OUTPATIENT)
Dept: INTERNAL MEDICINE | Facility: CLINIC | Age: 63
End: 2023-12-20
Payer: MEDICARE

## 2023-12-20 VITALS
WEIGHT: 224 LBS | HEART RATE: 55 BPM | DIASTOLIC BLOOD PRESSURE: 80 MMHG | SYSTOLIC BLOOD PRESSURE: 122 MMHG | BODY MASS INDEX: 27.28 KG/M2 | OXYGEN SATURATION: 97 % | HEIGHT: 76 IN

## 2023-12-20 DIAGNOSIS — Z23 ENCOUNTER FOR IMMUNIZATION: ICD-10-CM

## 2023-12-20 DIAGNOSIS — F41.9 ANXIETY DISORDER, UNSPECIFIED: ICD-10-CM

## 2023-12-20 PROCEDURE — 36415 COLL VENOUS BLD VENIPUNCTURE: CPT

## 2023-12-20 PROCEDURE — G0439: CPT

## 2023-12-20 PROCEDURE — 99213 OFFICE O/P EST LOW 20 MIN: CPT | Mod: 25

## 2023-12-20 RX ORDER — METOPROLOL TARTRATE 25 MG/1
25 TABLET, FILM COATED ORAL
Qty: 180 | Refills: 3 | Status: ACTIVE | COMMUNITY
Start: 2017-12-25 | End: 1900-01-01

## 2023-12-20 NOTE — PHYSICAL EXAM
[No Acute Distress] : no acute distress [Well Nourished] : well nourished [Well Developed] : well developed [Well-Appearing] : well-appearing [Normal Sclera/Conjunctiva] : normal sclera/conjunctiva [EOMI] : extraocular movements intact [Normal Outer Ear/Nose] : the outer ears and nose were normal in appearance [No JVD] : no jugular venous distention [No Lymphadenopathy] : no lymphadenopathy [Supple] : supple [Thyroid Normal, No Nodules] : the thyroid was normal and there were no nodules present [No Respiratory Distress] : no respiratory distress  [No Accessory Muscle Use] : no accessory muscle use [Clear to Auscultation] : lungs were clear to auscultation bilaterally [Normal Rate] : normal rate  [Regular Rhythm] : with a regular rhythm [Normal S1, S2] : normal S1 and S2 [No Murmur] : no murmur heard [No Carotid Bruits] : no carotid bruits [Pedal Pulses Present] : the pedal pulses are present [No Edema] : there was no peripheral edema [Soft] : abdomen soft [Non Tender] : non-tender [Non-distended] : non-distended [No Masses] : no abdominal mass palpated [No HSM] : no HSM [Normal Bowel Sounds] : normal bowel sounds [Normal Posterior Cervical Nodes] : no posterior cervical lymphadenopathy [Normal Anterior Cervical Nodes] : no anterior cervical lymphadenopathy [No CVA Tenderness] : no CVA  tenderness [No Spinal Tenderness] : no spinal tenderness [No Joint Swelling] : no joint swelling [Grossly Normal Strength/Tone] : grossly normal strength/tone [No Rash] : no rash [Coordination Grossly Intact] : coordination grossly intact [No Focal Deficits] : no focal deficits [Normal Gait] : normal gait [Speech Grossly Normal] : speech grossly normal [Normal Affect] : the affect was normal [Alert and Oriented x3] : oriented to person, place, and time [Normal Mood] : the mood was normal [Normal Insight/Judgement] : insight and judgment were intact [de-identified] : pale skin, no suspicious lesions,

## 2023-12-20 NOTE — HEALTH RISK ASSESSMENT
[Very Good] : ~his/her~  mood as very good [Yes] : Yes [Monthly or less (1 pt)] : Monthly or less (1 point) [1 or 2 (0 pts)] : 1 or 2 (0 points) [No] : In the past 12 months have you used drugs other than those required for medical reasons? No [No falls in past year] : Patient reported no falls in the past year [0] : 2) Feeling down, depressed, or hopeless: Not at all (0) [PHQ-2 Negative - No further assessment needed] : PHQ-2 Negative - No further assessment needed [Patient reported colonoscopy was normal] : Patient reported colonoscopy was normal [HIV test declined] : HIV test declined [Hepatitis C test declined] : Hepatitis C test declined [None] : None [With Significant Other] : lives with significant other [Employed] : employed [] :  [# Of Children ___] : has [unfilled] children [Feels Safe at Home] : Feels safe at home [Fully functional (bathing, dressing, toileting, transferring, walking, feeding)] : Fully functional (bathing, dressing, toileting, transferring, walking, feeding) [Fully functional (using the telephone, shopping, preparing meals, housekeeping, doing laundry, using] : Fully functional and needs no help or supervision to perform IADLs (using the telephone, shopping, preparing meals, housekeeping, doing laundry, using transportation, managing medications and managing finances) [Reports normal functional visual acuity (ie: able to read med bottle)] : Reports normal functional visual acuity [Smoke Detector] : smoke detector [Carbon Monoxide Detector] : carbon monoxide detector [Guns at Home] : guns at home [Safety elements used in home] : safety elements used in home [Seat Belt] :  uses seat belt [Sunscreen] : uses sunscreen [Never] : Never [FreeTextEntry1] : None at the moment. [Audit-CScore] : 1 [de-identified] : swimming and rowing xuan. [de-identified] : regular diet and well balanced [[id number]] : 0 [Change in mental status noted] : No change in mental status noted [Language] : denies difficulty with language [Behavior] : denies difficulty with behavior [Learning/Retaining New Information] : denies difficulty learning/retaining new information [Handling Complex Tasks] : denies difficulty handling complex tasks [Reasoning] : denies difficulty with reasoning [Spatial Ability and Orientation] : denies difficulty with spatial ability and orientation [Reports changes in hearing] : Reports no changes in hearing [Reports changes in vision] : Reports no changes in vision [Reports changes in dental health] : Reports no changes in dental health [Travel to Developing Areas] : does not  travel to developing areas [TB Exposure] : is not being exposed to tuberculosis [Caregiver Concerns] : does not have caregiver concerns [ColonoscopyDate] : 01/21 [ColonoscopyComments] : In [place],  5 years [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

## 2023-12-20 NOTE — HISTORY OF PRESENT ILLNESS
[FreeTextEntry1] : annual wellness [de-identified] : Pt here for medicare annual wellness. Pt feels well overall. His cardiac status has been stable.  Pt is on morphine chronically due to back pain that no other modalities have helped. He has tried 3 back surgeries, chiropractor, PT, nothing has helped. He swims and rows for exercise.

## 2023-12-20 NOTE — ASSESSMENT
[FreeTextEntry1] : Pt has had COVID booster and flu vaccine.  We had a discussion on prostate cancer screening and pt declined screening.   Derm visit advised for skin cancer screening.

## 2023-12-21 ENCOUNTER — TRANSCRIPTION ENCOUNTER (OUTPATIENT)
Age: 63
End: 2023-12-21

## 2023-12-21 LAB
ALBUMIN SERPL ELPH-MCNC: 4.8 G/DL
ALP BLD-CCNC: 110 U/L
ALT SERPL-CCNC: 21 U/L
ANION GAP SERPL CALC-SCNC: 10 MMOL/L
AST SERPL-CCNC: 24 U/L
BILIRUB SERPL-MCNC: 0.6 MG/DL
BUN SERPL-MCNC: 12 MG/DL
CALCIUM SERPL-MCNC: 9.8 MG/DL
CHLORIDE SERPL-SCNC: 93 MMOL/L
CHOLEST SERPL-MCNC: 165 MG/DL
CO2 SERPL-SCNC: 31 MMOL/L
CREAT SERPL-MCNC: 1.02 MG/DL
EGFR: 82 ML/MIN/1.73M2
ESTIMATED AVERAGE GLUCOSE: 111 MG/DL
GLUCOSE SERPL-MCNC: 118 MG/DL
HBA1C MFR BLD HPLC: 5.5 %
HCT VFR BLD CALC: 43.5 %
HDLC SERPL-MCNC: 59 MG/DL
HGB BLD-MCNC: 14.4 G/DL
LDLC SERPL CALC-MCNC: 80 MG/DL
MCHC RBC-ENTMCNC: 30.3 PG
MCHC RBC-ENTMCNC: 33.1 GM/DL
MCV RBC AUTO: 91.4 FL
NONHDLC SERPL-MCNC: 106 MG/DL
PLATELET # BLD AUTO: 225 K/UL
POTASSIUM SERPL-SCNC: 3.6 MMOL/L
PROT SERPL-MCNC: 7.1 G/DL
RBC # BLD: 4.76 M/UL
RBC # FLD: 12.7 %
SODIUM SERPL-SCNC: 134 MMOL/L
TRIGL SERPL-MCNC: 149 MG/DL
TSH SERPL-ACNC: 4.72 UIU/ML
WBC # FLD AUTO: 8.13 K/UL

## 2024-01-14 ENCOUNTER — RX RENEWAL (OUTPATIENT)
Age: 64
End: 2024-01-14

## 2024-01-14 RX ORDER — LOSARTAN POTASSIUM 25 MG/1
25 TABLET, FILM COATED ORAL DAILY
Qty: 100 | Refills: 2 | Status: ACTIVE | COMMUNITY
Start: 2017-12-25 | End: 1900-01-01

## 2024-01-16 ENCOUNTER — NON-APPOINTMENT (OUTPATIENT)
Age: 64
End: 2024-01-16

## 2024-01-28 ENCOUNTER — NON-APPOINTMENT (OUTPATIENT)
Age: 64
End: 2024-01-28

## 2024-06-17 ENCOUNTER — RX RENEWAL (OUTPATIENT)
Age: 64
End: 2024-06-17

## 2024-06-17 RX ORDER — CHLORTHALIDONE 25 MG/1
25 TABLET ORAL DAILY
Qty: 90 | Refills: 2 | Status: ACTIVE | COMMUNITY
Start: 2024-03-22 | End: 1900-01-01

## 2024-06-26 ENCOUNTER — APPOINTMENT (OUTPATIENT)
Dept: INTERNAL MEDICINE | Facility: CLINIC | Age: 64
End: 2024-06-26
Payer: MEDICARE

## 2024-06-26 ENCOUNTER — APPOINTMENT (OUTPATIENT)
Dept: ENDOCRINOLOGY | Facility: CLINIC | Age: 64
End: 2024-06-26
Payer: MEDICARE

## 2024-06-26 VITALS
BODY MASS INDEX: 27.28 KG/M2 | HEIGHT: 76 IN | HEART RATE: 64 BPM | WEIGHT: 224 LBS | DIASTOLIC BLOOD PRESSURE: 72 MMHG | SYSTOLIC BLOOD PRESSURE: 126 MMHG | OXYGEN SATURATION: 98 %

## 2024-06-26 VITALS
HEART RATE: 60 BPM | DIASTOLIC BLOOD PRESSURE: 70 MMHG | WEIGHT: 224 LBS | HEIGHT: 76 IN | OXYGEN SATURATION: 97 % | BODY MASS INDEX: 27.28 KG/M2 | SYSTOLIC BLOOD PRESSURE: 122 MMHG

## 2024-06-26 DIAGNOSIS — R97.20 ELEVATED PROSTATE, SPECIFIC ANTIGEN [PSA]: ICD-10-CM

## 2024-06-26 DIAGNOSIS — E78.00 PURE HYPERCHOLESTEROLEMIA, UNSPECIFIED: ICD-10-CM

## 2024-06-26 DIAGNOSIS — E03.9 HYPOTHYROIDISM, UNSPECIFIED: ICD-10-CM

## 2024-06-26 DIAGNOSIS — I10 ESSENTIAL (PRIMARY) HYPERTENSION: ICD-10-CM

## 2024-06-26 DIAGNOSIS — I48.91 UNSPECIFIED ATRIAL FIBRILLATION: ICD-10-CM

## 2024-06-26 DIAGNOSIS — R73.09 OTHER ABNORMAL GLUCOSE: ICD-10-CM

## 2024-06-26 PROCEDURE — G2211 COMPLEX E/M VISIT ADD ON: CPT

## 2024-06-26 PROCEDURE — 99215 OFFICE O/P EST HI 40 MIN: CPT | Mod: 25

## 2024-06-26 PROCEDURE — 36415 COLL VENOUS BLD VENIPUNCTURE: CPT

## 2024-06-26 PROCEDURE — 99213 OFFICE O/P EST LOW 20 MIN: CPT

## 2024-06-26 RX ORDER — SILDENAFIL 100 MG/1
100 TABLET, FILM COATED ORAL
Qty: 60 | Refills: 5 | Status: ACTIVE | COMMUNITY
Start: 2024-06-26 | End: 1900-01-01

## 2024-06-28 DIAGNOSIS — E87.6 HYPOKALEMIA: ICD-10-CM

## 2024-06-28 LAB
ALBUMIN SERPL ELPH-MCNC: 4.6 G/DL
ALDOSTERONE SERUM: 9.5 NG/DL
ALP BLD-CCNC: 98 U/L
ALT SERPL-CCNC: 21 U/L
ANION GAP SERPL CALC-SCNC: 11 MMOL/L
AST SERPL-CCNC: 23 U/L
BILIRUB DIRECT SERPL-MCNC: 0.2 MG/DL
BILIRUB INDIRECT SERPL-MCNC: 0.5 MG/DL
BILIRUB SERPL-MCNC: 0.7 MG/DL
BUN SERPL-MCNC: 13 MG/DL
CALCIUM SERPL-MCNC: 9.1 MG/DL
CHLORIDE SERPL-SCNC: 91 MMOL/L
CHOLEST SERPL-MCNC: 152 MG/DL
CO2 SERPL-SCNC: 33 MMOL/L
CREAT SERPL-MCNC: 1.01 MG/DL
EGFR: 82 ML/MIN/1.73M2
ESTIMATED AVERAGE GLUCOSE: 114 MG/DL
GLUCOSE SERPL-MCNC: 112 MG/DL
HBA1C MFR BLD HPLC: 5.6 %
HDLC SERPL-MCNC: 50 MG/DL
LDLC SERPL CALC-MCNC: 72 MG/DL
NONHDLC SERPL-MCNC: 102 MG/DL
POTASSIUM SERPL-SCNC: 3.1 MMOL/L
PROT SERPL-MCNC: 6.8 G/DL
PSA SERPL-MCNC: 0.38 NG/ML
RENIN ACTIVITY, PLASMA: 1.9 NG/ML/HR
SODIUM SERPL-SCNC: 136 MMOL/L
T3 SERPL-MCNC: 95 NG/DL
T4 SERPL-MCNC: 6.2 UG/DL
TRIGL SERPL-MCNC: 185 MG/DL
TSH SERPL-ACNC: 2.13 UIU/ML

## 2024-07-02 ENCOUNTER — RX RENEWAL (OUTPATIENT)
Age: 64
End: 2024-07-02

## 2024-07-13 ENCOUNTER — RX RENEWAL (OUTPATIENT)
Age: 64
End: 2024-07-13

## 2024-09-20 ENCOUNTER — RX RENEWAL (OUTPATIENT)
Age: 64
End: 2024-09-20

## 2024-10-02 ENCOUNTER — RX RENEWAL (OUTPATIENT)
Age: 64
End: 2024-10-02

## 2024-11-22 ENCOUNTER — RX RENEWAL (OUTPATIENT)
Age: 64
End: 2024-11-22

## 2025-01-02 ENCOUNTER — APPOINTMENT (OUTPATIENT)
Dept: INTERNAL MEDICINE | Facility: CLINIC | Age: 65
End: 2025-01-02
Payer: MEDICARE

## 2025-01-02 ENCOUNTER — LABORATORY RESULT (OUTPATIENT)
Age: 65
End: 2025-01-02

## 2025-01-02 ENCOUNTER — NON-APPOINTMENT (OUTPATIENT)
Age: 65
End: 2025-01-02

## 2025-01-02 VITALS
OXYGEN SATURATION: 95 % | HEIGHT: 76 IN | DIASTOLIC BLOOD PRESSURE: 80 MMHG | HEART RATE: 68 BPM | WEIGHT: 227 LBS | BODY MASS INDEX: 27.64 KG/M2 | SYSTOLIC BLOOD PRESSURE: 116 MMHG

## 2025-01-02 DIAGNOSIS — M54.50 LOW BACK PAIN, UNSPECIFIED: ICD-10-CM

## 2025-01-02 DIAGNOSIS — I10 ESSENTIAL (PRIMARY) HYPERTENSION: ICD-10-CM

## 2025-01-02 DIAGNOSIS — I48.91 UNSPECIFIED ATRIAL FIBRILLATION: ICD-10-CM

## 2025-01-02 DIAGNOSIS — J30.2 OTHER SEASONAL ALLERGIC RHINITIS: ICD-10-CM

## 2025-01-02 DIAGNOSIS — Z86.39 PERSONAL HISTORY OF OTHER ENDOCRINE, NUTRITIONAL AND METABOLIC DISEASE: ICD-10-CM

## 2025-01-02 DIAGNOSIS — Z87.898 PERSONAL HISTORY OF OTHER SPECIFIED CONDITIONS: ICD-10-CM

## 2025-01-02 DIAGNOSIS — E03.9 HYPOTHYROIDISM, UNSPECIFIED: ICD-10-CM

## 2025-01-02 DIAGNOSIS — F41.9 ANXIETY DISORDER, UNSPECIFIED: ICD-10-CM

## 2025-01-02 DIAGNOSIS — E78.00 PURE HYPERCHOLESTEROLEMIA, UNSPECIFIED: ICD-10-CM

## 2025-01-02 DIAGNOSIS — R73.09 OTHER ABNORMAL GLUCOSE: ICD-10-CM

## 2025-01-02 DIAGNOSIS — E78.1 PURE HYPERGLYCERIDEMIA: ICD-10-CM

## 2025-01-02 PROCEDURE — 99213 OFFICE O/P EST LOW 20 MIN: CPT | Mod: 25

## 2025-01-02 PROCEDURE — 93000 ELECTROCARDIOGRAM COMPLETE: CPT

## 2025-01-02 PROCEDURE — 36415 COLL VENOUS BLD VENIPUNCTURE: CPT

## 2025-01-02 PROCEDURE — G0439: CPT

## 2025-01-03 ENCOUNTER — TRANSCRIPTION ENCOUNTER (OUTPATIENT)
Age: 65
End: 2025-01-03

## 2025-01-03 LAB
ALBUMIN SERPL ELPH-MCNC: 5 G/DL
ALP BLD-CCNC: 95 U/L
ALT SERPL-CCNC: 30 U/L
ANION GAP SERPL CALC-SCNC: 12 MMOL/L
AST SERPL-CCNC: 23 U/L
BILIRUB SERPL-MCNC: 1.1 MG/DL
BUN SERPL-MCNC: 13 MG/DL
CALCIUM SERPL-MCNC: 10 MG/DL
CHLORIDE SERPL-SCNC: 91 MMOL/L
CHOLEST SERPL-MCNC: 166 MG/DL
CO2 SERPL-SCNC: 30 MMOL/L
CREAT SERPL-MCNC: 0.98 MG/DL
EGFR: 85 ML/MIN/1.73M2
GLUCOSE SERPL-MCNC: 113 MG/DL
HCT VFR BLD CALC: 47.6 %
HDLC SERPL-MCNC: 61 MG/DL
HGB BLD-MCNC: 15.8 G/DL
LDLC SERPL CALC-MCNC: 85 MG/DL
MCHC RBC-ENTMCNC: 30.5 PG
MCHC RBC-ENTMCNC: 33.2 G/DL
MCV RBC AUTO: 91.9 FL
NONHDLC SERPL-MCNC: 105 MG/DL
PLATELET # BLD AUTO: 272 K/UL
POTASSIUM SERPL-SCNC: 3.7 MMOL/L
PROT SERPL-MCNC: 7.7 G/DL
RBC # BLD: 5.18 M/UL
RBC # FLD: 12.3 %
SODIUM SERPL-SCNC: 133 MMOL/L
TRIGL SERPL-MCNC: 113 MG/DL
TSH SERPL-ACNC: 2.21 UIU/ML
WBC # FLD AUTO: 7.12 K/UL

## 2025-01-06 ENCOUNTER — TRANSCRIPTION ENCOUNTER (OUTPATIENT)
Age: 65
End: 2025-01-06

## 2025-01-06 LAB
A ALTERNATA IGE QN: <0.1 KUA/L
A FUMIGATUS IGE QN: <0.1 KUA/L
BERMUDA GRASS IGE QN: <0.1 KUA/L
BOXELDER IGE QN: <0.1 KUA/L
C HERBARUM IGE QN: <0.1 KUA/L
CALIF WALNUT IGE QN: <0.1 KUA/L
CAT DANDER IGE QN: <0.1 KUA/L
CMN PIGWEED IGE QN: <0.1 KUA/L
COMMON RAGWEED IGE QN: <0.1 KUA/L
COTTONWOOD IGE QN: <0.1 KUA/L
D FARINAE IGE QN: <0.1 KUA/L
D PTERONYSS IGE QN: <0.1 KUA/L
DEPRECATED A ALTERNATA IGE RAST QL: 0
DEPRECATED A FUMIGATUS IGE RAST QL: 0
DEPRECATED BERMUDA GRASS IGE RAST QL: 0
DEPRECATED BOXELDER IGE RAST QL: 0
DEPRECATED C HERBARUM IGE RAST QL: 0
DEPRECATED CAT DANDER IGE RAST QL: 0
DEPRECATED COMMON PIGWEED IGE RAST QL: 0
DEPRECATED COMMON RAGWEED IGE RAST QL: 0
DEPRECATED COTTONWOOD IGE RAST QL: 0
DEPRECATED D FARINAE IGE RAST QL: 0
DEPRECATED D PTERONYSS IGE RAST QL: 0
DEPRECATED DOG DANDER IGE RAST QL: 0
DEPRECATED GOOSEFOOT IGE RAST QL: 0
DEPRECATED LONDON PLANE IGE RAST QL: 0
DEPRECATED MOUSE URINE PROT IGE RAST QL: 0
DEPRECATED MUGWORT IGE RAST QL: 0
DEPRECATED P NOTATUM IGE RAST QL: 0
DEPRECATED RED CEDAR IGE RAST QL: 0
DEPRECATED ROACH IGE RAST QL: 0
DEPRECATED SHEEP SORREL IGE RAST QL: 0
DEPRECATED SILVER BIRCH IGE RAST QL: 0
DEPRECATED TIMOTHY IGE RAST QL: 0
DEPRECATED WHITE ASH IGE RAST QL: 0
DEPRECATED WHITE OAK IGE RAST QL: 0
DOG DANDER IGE QN: <0.1 KUA/L
GOOSEFOOT IGE QN: <0.1 KUA/L
LONDON PLANE IGE QN: <0.1 KUA/L
MOUSE URINE PROT IGE QN: <0.1 KUA/L
MUGWORT IGE QN: <0.1 KUA/L
MULBERRY (T70) CLASS: 0
MULBERRY (T70) CONC: <0.1 KUA/L
P NOTATUM IGE QN: <0.1 KUA/L
RED CEDAR IGE QN: <0.1 KUA/L
ROACH IGE QN: <0.1 KUA/L
SHEEP SORREL IGE QN: <0.1 KUA/L
SILVER BIRCH IGE QN: <0.1 KUA/L
TIMOTHY IGE QN: <0.1 KUA/L
TOTAL IGE SMQN RAST: 38 KU/L
TREE ALLERG MIX1 IGE QL: 0
WHITE ASH IGE QN: <0.1 KUA/L
WHITE ELM IGE QN: 0
WHITE ELM IGE QN: <0.1 KUA/L
WHITE OAK IGE QN: <0.1 KUA/L

## 2025-05-05 ENCOUNTER — RX RENEWAL (OUTPATIENT)
Age: 65
End: 2025-05-05

## 2025-05-16 ENCOUNTER — RX RENEWAL (OUTPATIENT)
Age: 65
End: 2025-05-16

## 2025-08-11 ENCOUNTER — APPOINTMENT (OUTPATIENT)
Dept: ENDOCRINOLOGY | Facility: CLINIC | Age: 65
End: 2025-08-11
Payer: MEDICARE

## 2025-08-11 VITALS
SYSTOLIC BLOOD PRESSURE: 118 MMHG | HEART RATE: 67 BPM | BODY MASS INDEX: 26.18 KG/M2 | HEIGHT: 76 IN | DIASTOLIC BLOOD PRESSURE: 74 MMHG | WEIGHT: 215 LBS | OXYGEN SATURATION: 97 %

## 2025-08-11 DIAGNOSIS — E03.9 HYPOTHYROIDISM, UNSPECIFIED: ICD-10-CM

## 2025-08-11 PROCEDURE — 99215 OFFICE O/P EST HI 40 MIN: CPT

## 2025-08-11 PROCEDURE — G2211 COMPLEX E/M VISIT ADD ON: CPT

## 2025-08-11 PROCEDURE — 36415 COLL VENOUS BLD VENIPUNCTURE: CPT

## 2025-08-14 ENCOUNTER — APPOINTMENT (OUTPATIENT)
Dept: INTERNAL MEDICINE | Facility: CLINIC | Age: 65
End: 2025-08-14
Payer: MEDICARE

## 2025-08-14 VITALS
OXYGEN SATURATION: 98 % | HEIGHT: 76 IN | SYSTOLIC BLOOD PRESSURE: 116 MMHG | HEART RATE: 82 BPM | DIASTOLIC BLOOD PRESSURE: 82 MMHG | BODY MASS INDEX: 26.18 KG/M2 | WEIGHT: 215 LBS

## 2025-08-14 DIAGNOSIS — E53.8 DEFICIENCY OF OTHER SPECIFIED B GROUP VITAMINS: ICD-10-CM

## 2025-08-14 DIAGNOSIS — R79.89 OTHER SPECIFIED ABNORMAL FINDINGS OF BLOOD CHEMISTRY: ICD-10-CM

## 2025-08-14 DIAGNOSIS — F41.9 ANXIETY DISORDER, UNSPECIFIED: ICD-10-CM

## 2025-08-14 DIAGNOSIS — R73.09 OTHER ABNORMAL GLUCOSE: ICD-10-CM

## 2025-08-14 DIAGNOSIS — I10 ESSENTIAL (PRIMARY) HYPERTENSION: ICD-10-CM

## 2025-08-14 DIAGNOSIS — E78.1 PURE HYPERGLYCERIDEMIA: ICD-10-CM

## 2025-08-14 DIAGNOSIS — I48.91 UNSPECIFIED ATRIAL FIBRILLATION: ICD-10-CM

## 2025-08-14 DIAGNOSIS — I49.49 OTHER PREMATURE DEPOLARIZATION: ICD-10-CM

## 2025-08-14 PROCEDURE — G2211 COMPLEX E/M VISIT ADD ON: CPT

## 2025-08-14 PROCEDURE — 99214 OFFICE O/P EST MOD 30 MIN: CPT

## 2025-08-15 ENCOUNTER — NON-APPOINTMENT (OUTPATIENT)
Age: 65
End: 2025-08-15

## 2025-08-15 LAB
ALBUMIN SERPL ELPH-MCNC: 4.6 G/DL
ALP BLD-CCNC: 98 U/L
ALT SERPL-CCNC: 24 U/L
ANION GAP SERPL CALC-SCNC: 16 MMOL/L
AST SERPL-CCNC: 27 U/L
BASOPHILS # BLD AUTO: 0.09 K/UL
BASOPHILS NFR BLD AUTO: 1.2 %
BILIRUB DIRECT SERPL-MCNC: 0.28 MG/DL
BILIRUB INDIRECT SERPL-MCNC: 0.5 MG/DL
BILIRUB SERPL-MCNC: 0.8 MG/DL
BUN SERPL-MCNC: 14 MG/DL
CALCIUM SERPL-MCNC: 9.7 MG/DL
CHLORIDE SERPL-SCNC: 92 MMOL/L
CHOLEST SERPL-MCNC: 143 MG/DL
CO2 SERPL-SCNC: 27 MMOL/L
CREAT SERPL-MCNC: 0.95 MG/DL
EGFRCR SERPLBLD CKD-EPI 2021: 88 ML/MIN/1.73M2
EOSINOPHIL # BLD AUTO: 0.1 K/UL
EOSINOPHIL NFR BLD AUTO: 1.4 %
ESTIMATED AVERAGE GLUCOSE: 117 MG/DL
GLUCOSE SERPL-MCNC: 129 MG/DL
HBA1C MFR BLD HPLC: 5.7 %
HCT VFR BLD CALC: 43.3 %
HDLC SERPL-MCNC: 48 MG/DL
HGB BLD-MCNC: 14.4 G/DL
IMM GRANULOCYTES NFR BLD AUTO: 0.1 %
LDLC SERPL-MCNC: 83 MG/DL
LYMPHOCYTES # BLD AUTO: 1.37 K/UL
LYMPHOCYTES NFR BLD AUTO: 18.8 %
MAN DIFF?: NORMAL
MCHC RBC-ENTMCNC: 30.6 PG
MCHC RBC-ENTMCNC: 33.3 G/DL
MCV RBC AUTO: 92.1 FL
MONOCYTES # BLD AUTO: 0.52 K/UL
MONOCYTES NFR BLD AUTO: 7.1 %
NEUTROPHILS # BLD AUTO: 5.21 K/UL
NEUTROPHILS NFR BLD AUTO: 71.4 %
NONHDLC SERPL-MCNC: 95 MG/DL
PLATELET # BLD AUTO: 232 K/UL
POTASSIUM SERPL-SCNC: 3.6 MMOL/L
PROT SERPL-MCNC: 6.9 G/DL
PSA FREE FLD-MCNC: 48 %
PSA FREE SERPL-MCNC: 0.2 NG/ML
PSA SERPL-MCNC: 0.42 NG/ML
RBC # BLD: 4.7 M/UL
RBC # FLD: 12.4 %
SODIUM SERPL-SCNC: 135 MMOL/L
T4 SERPL-MCNC: 6 UG/DL
TRIGL SERPL-MCNC: 57 MG/DL
TSH SERPL-ACNC: 1.34 UIU/ML
VIT B12 SERPL-MCNC: 697 PG/ML
WBC # FLD AUTO: 7.3 K/UL

## 2025-09-03 ENCOUNTER — APPOINTMENT (OUTPATIENT)
Dept: CARDIOLOGY | Facility: CLINIC | Age: 65
End: 2025-09-03

## 2025-09-03 VITALS
DIASTOLIC BLOOD PRESSURE: 60 MMHG | HEART RATE: 46 BPM | BODY MASS INDEX: 26.78 KG/M2 | SYSTOLIC BLOOD PRESSURE: 100 MMHG | OXYGEN SATURATION: 98 % | WEIGHT: 220 LBS

## 2025-09-03 DIAGNOSIS — I48.91 UNSPECIFIED ATRIAL FIBRILLATION: ICD-10-CM

## 2025-09-03 DIAGNOSIS — I10 ESSENTIAL (PRIMARY) HYPERTENSION: ICD-10-CM

## 2025-09-03 PROCEDURE — 93000 ELECTROCARDIOGRAM COMPLETE: CPT | Mod: XU

## 2025-09-03 PROCEDURE — 93242 EXT ECG>48HR<7D RECORDING: CPT

## 2025-09-03 PROCEDURE — 99205 OFFICE O/P NEW HI 60 MIN: CPT

## 2025-09-03 RX ORDER — APIXABAN 5 MG/1
5 TABLET, FILM COATED ORAL
Qty: 180 | Refills: 3 | Status: ACTIVE | COMMUNITY
Start: 2025-09-03 | End: 1900-01-01